# Patient Record
Sex: MALE | Race: OTHER | Employment: UNEMPLOYED | URBAN - METROPOLITAN AREA
[De-identification: names, ages, dates, MRNs, and addresses within clinical notes are randomized per-mention and may not be internally consistent; named-entity substitution may affect disease eponyms.]

---

## 2017-01-05 ENCOUNTER — HOSPITAL ENCOUNTER (EMERGENCY)
Age: 82
Discharge: HOME | End: 2017-01-05
Attending: EMERGENCY MEDICINE
Payer: MEDICARE

## 2017-01-05 VITALS
HEART RATE: 74 BPM | RESPIRATION RATE: 16 BRPM | TEMPERATURE: 97.7 F | DIASTOLIC BLOOD PRESSURE: 60 MMHG | SYSTOLIC BLOOD PRESSURE: 136 MMHG | OXYGEN SATURATION: 94 %

## 2017-01-05 DIAGNOSIS — S51.811A SKIN TEAR OF FOREARM WITHOUT COMPLICATION, RIGHT, INITIAL ENCOUNTER: Primary | ICD-10-CM

## 2017-01-05 PROCEDURE — 99283 EMERGENCY DEPT VISIT LOW MDM: CPT | Performed by: EMERGENCY MEDICINE

## 2017-01-05 PROCEDURE — 74011250636 HC RX REV CODE- 250/636: Performed by: EMERGENCY MEDICINE

## 2017-01-05 PROCEDURE — 90471 IMMUNIZATION ADMIN: CPT | Performed by: EMERGENCY MEDICINE

## 2017-01-05 PROCEDURE — 90715 TDAP VACCINE 7 YRS/> IM: CPT | Performed by: EMERGENCY MEDICINE

## 2017-01-05 RX ADMIN — TETANUS TOXOID, REDUCED DIPHTHERIA TOXOID AND ACELLULAR PERTUSSIS VACCINE, ADSORBED 0.5 ML: 5; 2.5; 8; 8; 2.5 SUSPENSION INTRAMUSCULAR at 09:04

## 2017-01-05 NOTE — ED NOTES
Wound to right forearm cleaned with Wound . Steri-strips placed and coban dressing applied per CHAIM Camarena RN. Pt tolerated well.
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
## 2017-01-05 NOTE — DISCHARGE INSTRUCTIONS
Skin Tears: Care Instructions  Your Care Instructions  As we get older, our skin gets drier and more fragile. Sometimes this can cause the outer layers of skin to split and tear open. Skin tears are treated in different ways. In some cases, doctors use pieces of tape called Steri-Strips to pull the skin together and help it heal. Other times, it's best to leave the tear open and cover it with a special wound-care bandage. Skin tears are usually not serious. They usually heal in a few weeks. But how long you take to heal depends on your body and the type of tear you have. Sometimes the torn piece of skin is used to protect the wound while it heals. But that piece of skin does not heal. It may fall off on its own. Or the doctor may remove it. As your tear heals, it's important to keep it clean to help prevent infection. The doctor has checked you carefully, but problems can develop later. If you notice any problems or new symptoms, get medical treatment right away. Follow-up care is a key part of your treatment and safety. Be sure to make and go to all appointments, and call your doctor if you are having problems. It's also a good idea to know your test results and keep a list of the medicines you take. How can you care for yourself at home? · If you have pain, ask your doctor if you can take an over-the-counter pain medicine, such as acetaminophen (Tylenol), ibuprofen (Advil, Motrin), or naproxen (Aleve). Be safe with medicines. Read and follow all instructions on the label. · If you have a bandage, follow your doctor's instructions for changing it. · If you have Steri-Strips, leave them on until they fall off. · Follow your doctor's instructions about bathing. · Gently wash the skin tear with plain water 2 times a day. Do not rub the area. · Let the area air dry. Or you can pat it carefully with a soft towel. When should you call for help?   Call your doctor now or seek immediate medical care if:  · You have signs of infection, such as:  ¨ Increased pain, swelling, warmth, or redness around the tear. ¨ Red streaks leading from the tear. ¨ Pus draining from the tear. ¨ A fever. · The tear starts to bleed a lot. Small amounts of blood are normal.  Watch closely for changes in your health, and be sure to contact your doctor if:  · You do not get better as expected. Where can you learn more? Go to http://sukumar-pramod.info/. Enter B116 in the search box to learn more about \"Skin Tears: Care Instructions. \"  Current as of: May 27, 2016  Content Version: 11.1  © 6004-7520 [employer]. Care instructions adapted under license by [employer] (which disclaims liability or warranty for this information). If you have questions about a medical condition or this instruction, always ask your healthcare professional. [address] any warranty or liability for your use of this information.
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
## 2017-01-05 NOTE — ED NOTES
Talked with  from facility and explained that pt was very cooperative here and was not suicidal so Dr. Kristin Shaver did not feel that a psych evaluation was necessary. Explained to her that pt can be committed from their facility if they feel it is necessary to do so.  is upset and feels that the pt should have been sent to another facility from the ER. Explained again that the MD did not feel the pt was a threat to himself or others.

## 2017-10-27 ENCOUNTER — HOSPITAL ENCOUNTER (EMERGENCY)
Age: 82
Discharge: HOME | End: 2017-10-27
Attending: EMERGENCY MEDICINE
Payer: MEDICARE

## 2017-10-27 ENCOUNTER — APPOINTMENT (OUTPATIENT)
Dept: CT IMAGING | Age: 82
End: 2017-10-27
Attending: EMERGENCY MEDICINE
Payer: MEDICARE

## 2017-10-27 ENCOUNTER — APPOINTMENT (OUTPATIENT)
Dept: GENERAL RADIOLOGY | Age: 82
End: 2017-10-27
Attending: EMERGENCY MEDICINE
Payer: MEDICARE

## 2017-10-27 ENCOUNTER — APPOINTMENT (OUTPATIENT)
Dept: ULTRASOUND IMAGING | Age: 82
End: 2017-10-27
Attending: EMERGENCY MEDICINE
Payer: MEDICARE

## 2017-10-27 VITALS
BODY MASS INDEX: 27.49 KG/M2 | WEIGHT: 165 LBS | OXYGEN SATURATION: 97 % | HEART RATE: 88 BPM | DIASTOLIC BLOOD PRESSURE: 80 MMHG | HEIGHT: 65 IN | TEMPERATURE: 98 F | RESPIRATION RATE: 16 BRPM | SYSTOLIC BLOOD PRESSURE: 190 MMHG

## 2017-10-27 DIAGNOSIS — S62.647A CLOSED NONDISPLACED FRACTURE OF PROXIMAL PHALANX OF LEFT LITTLE FINGER, INITIAL ENCOUNTER: Primary | ICD-10-CM

## 2017-10-27 LAB
ALBUMIN SERPL-MCNC: 3.2 G/DL (ref 3.2–4.6)
ALBUMIN/GLOB SERPL: 0.9 {RATIO} (ref 1.2–3.5)
ALP SERPL-CCNC: 69 U/L (ref 50–136)
ALT SERPL-CCNC: 19 U/L (ref 12–65)
ANION GAP SERPL CALC-SCNC: 5 MMOL/L (ref 7–16)
AST SERPL-CCNC: 18 U/L (ref 15–37)
ATRIAL RATE: 56 BPM
BASOPHILS # BLD: 0 K/UL (ref 0–0.2)
BASOPHILS NFR BLD: 0 % (ref 0–2)
BILIRUB SERPL-MCNC: 0.4 MG/DL (ref 0.2–1.1)
BNP SERPL-MCNC: 569 PG/ML
BUN SERPL-MCNC: 20 MG/DL (ref 8–23)
CALCIUM SERPL-MCNC: 8.7 MG/DL (ref 8.3–10.4)
CALCULATED P AXIS, ECG09: 78 DEGREES
CALCULATED R AXIS, ECG10: 69 DEGREES
CALCULATED T AXIS, ECG11: -117 DEGREES
CHLORIDE SERPL-SCNC: 105 MMOL/L (ref 98–107)
CO2 SERPL-SCNC: 33 MMOL/L (ref 21–32)
CREAT SERPL-MCNC: 1.59 MG/DL (ref 0.8–1.5)
DIAGNOSIS, 93000: NORMAL
DIFFERENTIAL METHOD BLD: ABNORMAL
EOSINOPHIL # BLD: 0.6 K/UL (ref 0–0.8)
EOSINOPHIL NFR BLD: 9 % (ref 0.5–7.8)
ERYTHROCYTE [DISTWIDTH] IN BLOOD BY AUTOMATED COUNT: 13.3 % (ref 11.9–14.6)
GLOBULIN SER CALC-MCNC: 3.6 G/DL (ref 2.3–3.5)
GLUCOSE SERPL-MCNC: 80 MG/DL (ref 65–100)
HCT VFR BLD AUTO: 39.8 % (ref 41.1–50.3)
HGB BLD-MCNC: 12.9 G/DL (ref 13.6–17.2)
IMM GRANULOCYTES # BLD: 0 K/UL (ref 0–0.5)
IMM GRANULOCYTES NFR BLD: 0 % (ref 0–5)
LYMPHOCYTES # BLD: 2 K/UL (ref 0.5–4.6)
LYMPHOCYTES NFR BLD: 32 % (ref 13–44)
MCH RBC QN AUTO: 32 PG (ref 26.1–32.9)
MCHC RBC AUTO-ENTMCNC: 32.4 G/DL (ref 31.4–35)
MCV RBC AUTO: 98.8 FL (ref 79.6–97.8)
MONOCYTES # BLD: 0.4 K/UL (ref 0.1–1.3)
MONOCYTES NFR BLD: 7 % (ref 4–12)
NEUTS SEG # BLD: 3.2 K/UL (ref 1.7–8.2)
NEUTS SEG NFR BLD: 52 % (ref 43–78)
P-R INTERVAL, ECG05: 216 MS
PLATELET # BLD AUTO: 155 K/UL (ref 150–450)
PMV BLD AUTO: 10.1 FL (ref 10.8–14.1)
POTASSIUM SERPL-SCNC: 4.1 MMOL/L (ref 3.5–5.1)
PROT SERPL-MCNC: 6.8 G/DL (ref 6.3–8.2)
Q-T INTERVAL, ECG07: 444 MS
QRS DURATION, ECG06: 74 MS
QTC CALCULATION (BEZET), ECG08: 428 MS
RBC # BLD AUTO: 4.03 M/UL (ref 4.23–5.67)
SODIUM SERPL-SCNC: 143 MMOL/L (ref 136–145)
TROPONIN I SERPL-MCNC: 0.02 NG/ML (ref 0.02–0.05)
VENTRICULAR RATE, ECG03: 56 BPM
WBC # BLD AUTO: 6.1 K/UL (ref 4.3–11.1)

## 2017-10-27 PROCEDURE — 99285 EMERGENCY DEPT VISIT HI MDM: CPT | Performed by: EMERGENCY MEDICINE

## 2017-10-27 PROCEDURE — 80053 COMPREHEN METABOLIC PANEL: CPT | Performed by: EMERGENCY MEDICINE

## 2017-10-27 PROCEDURE — 71020 XR CHEST PA LAT: CPT

## 2017-10-27 PROCEDURE — 74011000250 HC RX REV CODE- 250: Performed by: EMERGENCY MEDICINE

## 2017-10-27 PROCEDURE — 93005 ELECTROCARDIOGRAM TRACING: CPT | Performed by: EMERGENCY MEDICINE

## 2017-10-27 PROCEDURE — 93971 EXTREMITY STUDY: CPT

## 2017-10-27 PROCEDURE — 73130 X-RAY EXAM OF HAND: CPT

## 2017-10-27 PROCEDURE — 85025 COMPLETE CBC W/AUTO DIFF WBC: CPT | Performed by: EMERGENCY MEDICINE

## 2017-10-27 PROCEDURE — 84484 ASSAY OF TROPONIN QUANT: CPT | Performed by: EMERGENCY MEDICINE

## 2017-10-27 PROCEDURE — 70450 CT HEAD/BRAIN W/O DYE: CPT

## 2017-10-27 PROCEDURE — 83880 ASSAY OF NATRIURETIC PEPTIDE: CPT | Performed by: EMERGENCY MEDICINE

## 2017-10-27 RX ORDER — IPRATROPIUM BROMIDE AND ALBUTEROL SULFATE 2.5; .5 MG/3ML; MG/3ML
3 SOLUTION RESPIRATORY (INHALATION)
Status: COMPLETED | OUTPATIENT
Start: 2017-10-27 | End: 2017-10-27

## 2017-10-27 RX ADMIN — IPRATROPIUM BROMIDE AND ALBUTEROL SULFATE 3 ML: .5; 3 SOLUTION RESPIRATORY (INHALATION) at 14:53

## 2017-10-27 NOTE — ED NOTES
Called report back to [facility] to [place], Dr. Edwin Keating was made aware of pt's BP, MD was o.k.  With [place] keeping with pt's regular scheduled BP medications when he arrived back

## 2017-10-27 NOTE — PROGRESS NOTES
present for nurse and doctor's assessment.           Marcelino Luna CHI//Patient Relations  [place]  [address] / [place], [address] [facility]

## 2017-10-27 NOTE — DISCHARGE INSTRUCTIONS
As we discussed, he can try to keep the splint on his hand for the next 2 or 3 weeks. However, if it becomes a struggle due to his dementia and is not the kind of injury that will get significantly worse if it is not splinted and you do not need to fight with him to keep it on. Return with any fevers, vomiting, worsening symptoms, or additional concerns. Follow up with his primary care doctor to have his hand   rexrayed in approximately 2 weeks.
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
## 2018-02-13 ENCOUNTER — HOSPITAL ENCOUNTER (OUTPATIENT)
Dept: LAB | Age: 83
Discharge: HOME | End: 2018-02-13

## 2018-02-13 LAB
ALBUMIN SERPL-MCNC: 3.2 G/DL (ref 3.2–4.6)
ALBUMIN/GLOB SERPL: 1.1 {RATIO} (ref 1.2–3.5)
ALP SERPL-CCNC: 49 U/L (ref 50–136)
ALT SERPL-CCNC: 19 U/L (ref 12–65)
ANION GAP SERPL CALC-SCNC: 9 MMOL/L (ref 7–16)
AST SERPL-CCNC: 19 U/L (ref 15–37)
BILIRUB SERPL-MCNC: 0.5 MG/DL (ref 0.2–1.1)
BUN SERPL-MCNC: 19 MG/DL (ref 8–23)
CALCIUM SERPL-MCNC: 8.3 MG/DL (ref 8.3–10.4)
CHLORIDE SERPL-SCNC: 106 MMOL/L (ref 98–107)
CO2 SERPL-SCNC: 28 MMOL/L (ref 21–32)
CREAT SERPL-MCNC: 1.35 MG/DL (ref 0.8–1.5)
ERYTHROCYTE [DISTWIDTH] IN BLOOD BY AUTOMATED COUNT: 13.4 % (ref 11.9–14.6)
GLOBULIN SER CALC-MCNC: 2.9 G/DL (ref 2.3–3.5)
GLUCOSE SERPL-MCNC: 89 MG/DL (ref 65–100)
HCT VFR BLD AUTO: 37.8 % (ref 41.1–50.3)
HGB BLD-MCNC: 12.5 G/DL (ref 13.6–17.2)
MCH RBC QN AUTO: 32.5 PG (ref 26.1–32.9)
MCHC RBC AUTO-ENTMCNC: 33.1 G/DL (ref 31.4–35)
MCV RBC AUTO: 98.2 FL (ref 79.6–97.8)
PLATELET # BLD AUTO: 142 K/UL (ref 150–450)
PMV BLD AUTO: 10 FL (ref 10.8–14.1)
POTASSIUM SERPL-SCNC: 4 MMOL/L (ref 3.5–5.1)
PROT SERPL-MCNC: 6.1 G/DL (ref 6.3–8.2)
RBC # BLD AUTO: 3.85 M/UL (ref 4.23–5.67)
SODIUM SERPL-SCNC: 143 MMOL/L (ref 136–145)
WBC # BLD AUTO: 5.6 K/UL (ref 4.3–11.1)

## 2018-02-13 PROCEDURE — 85027 COMPLETE CBC AUTOMATED: CPT | Performed by: GENERAL PRACTICE

## 2018-02-13 PROCEDURE — 80053 COMPREHEN METABOLIC PANEL: CPT | Performed by: GENERAL PRACTICE

## 2018-09-17 ENCOUNTER — APPOINTMENT (OUTPATIENT)
Dept: CT IMAGING | Age: 83
DRG: 689 | End: 2018-09-17
Attending: EMERGENCY MEDICINE
Payer: MEDICARE

## 2018-09-17 ENCOUNTER — HOSPITAL ENCOUNTER (INPATIENT)
Age: 83
LOS: 1 days | Discharge: NURSING, REHAB OR LONG-TERM CARE | DRG: 689 | End: 2018-09-20
Attending: EMERGENCY MEDICINE | Admitting: INTERNAL MEDICINE
Payer: MEDICARE

## 2018-09-17 ENCOUNTER — APPOINTMENT (OUTPATIENT)
Dept: GENERAL RADIOLOGY | Age: 83
DRG: 689 | End: 2018-09-17
Attending: EMERGENCY MEDICINE
Payer: MEDICARE

## 2018-09-17 DIAGNOSIS — G93.40 ACUTE ENCEPHALOPATHY: Primary | ICD-10-CM

## 2018-09-17 DIAGNOSIS — N30.00 ACUTE CYSTITIS WITHOUT HEMATURIA: ICD-10-CM

## 2018-09-17 PROBLEM — G93.41 ACUTE METABOLIC ENCEPHALOPATHY: Status: ACTIVE | Noted: 2018-09-17

## 2018-09-17 PROBLEM — N39.0 UTI (URINARY TRACT INFECTION): Status: ACTIVE | Noted: 2018-09-17

## 2018-09-17 LAB
ALBUMIN SERPL-MCNC: 3.1 G/DL (ref 3.2–4.6)
ALBUMIN/GLOB SERPL: 0.9 {RATIO} (ref 1.2–3.5)
ALP SERPL-CCNC: 48 U/L (ref 50–136)
ALT SERPL-CCNC: 17 U/L (ref 12–65)
AMMONIA PLAS-SCNC: 28 UMOL/L (ref 11–32)
AMPHET UR QL SCN: NEGATIVE
ANION GAP SERPL CALC-SCNC: 9 MMOL/L (ref 7–16)
APPEARANCE UR: CLEAR
AST SERPL-CCNC: 18 U/L (ref 15–37)
ATRIAL RATE: 64 BPM
BACTERIA URNS QL MICRO: ABNORMAL /HPF
BARBITURATES UR QL SCN: NEGATIVE
BASOPHILS # BLD: 0 K/UL (ref 0–0.2)
BASOPHILS NFR BLD: 0 % (ref 0–2)
BENZODIAZ UR QL: NEGATIVE
BILIRUB SERPL-MCNC: 0.3 MG/DL (ref 0.2–1.1)
BILIRUB UR QL: NEGATIVE
BUN SERPL-MCNC: 20 MG/DL (ref 8–23)
CALCIUM SERPL-MCNC: 8.6 MG/DL (ref 8.3–10.4)
CALCULATED P AXIS, ECG09: 73 DEGREES
CALCULATED R AXIS, ECG10: 54 DEGREES
CALCULATED T AXIS, ECG11: -101 DEGREES
CANNABINOIDS UR QL SCN: NEGATIVE
CASTS URNS QL MICRO: ABNORMAL /LPF
CHLORIDE SERPL-SCNC: 108 MMOL/L (ref 98–107)
CK SERPL-CCNC: 284 U/L (ref 21–215)
CO2 SERPL-SCNC: 27 MMOL/L (ref 21–32)
COCAINE UR QL SCN: NEGATIVE
COLOR UR: YELLOW
CREAT SERPL-MCNC: 1.21 MG/DL (ref 0.8–1.5)
DIAGNOSIS, 93000: NORMAL
DIFFERENTIAL METHOD BLD: ABNORMAL
EOSINOPHIL # BLD: 0.3 K/UL (ref 0–0.8)
EOSINOPHIL NFR BLD: 5 % (ref 0.5–7.8)
EPI CELLS #/AREA URNS HPF: 0 /HPF
ERYTHROCYTE [DISTWIDTH] IN BLOOD BY AUTOMATED COUNT: 13.3 %
GLOBULIN SER CALC-MCNC: 3.4 G/DL (ref 2.3–3.5)
GLUCOSE SERPL-MCNC: 104 MG/DL (ref 65–100)
GLUCOSE UR STRIP.AUTO-MCNC: NEGATIVE MG/DL
HCT VFR BLD AUTO: 39.1 % (ref 41.1–50.3)
HGB BLD-MCNC: 13.1 G/DL (ref 13.6–17.2)
HGB UR QL STRIP: NEGATIVE
IMM GRANULOCYTES # BLD: 0 K/UL (ref 0–0.5)
IMM GRANULOCYTES NFR BLD AUTO: 0 % (ref 0–5)
KETONES UR QL STRIP.AUTO: NEGATIVE MG/DL
LACTATE BLD-SCNC: 1.5 MMOL/L (ref 0.5–1.9)
LEUKOCYTE ESTERASE UR QL STRIP.AUTO: ABNORMAL
LYMPHOCYTES # BLD: 1.9 K/UL (ref 0.5–4.6)
LYMPHOCYTES NFR BLD: 36 % (ref 13–44)
MAGNESIUM SERPL-MCNC: 2.1 MG/DL (ref 1.8–2.4)
MCH RBC QN AUTO: 33.9 PG (ref 26.1–32.9)
MCHC RBC AUTO-ENTMCNC: 33.5 G/DL (ref 31.4–35)
MCV RBC AUTO: 101 FL (ref 79.6–97.8)
METHADONE UR QL: NEGATIVE
MONOCYTES # BLD: 0.5 K/UL (ref 0.1–1.3)
MONOCYTES NFR BLD: 10 % (ref 4–12)
NEUTS SEG # BLD: 2.5 K/UL (ref 1.7–8.2)
NEUTS SEG NFR BLD: 48 % (ref 43–78)
NITRITE UR QL STRIP.AUTO: POSITIVE
NRBC # BLD: 0 K/UL (ref 0–0.2)
OPIATES UR QL: NEGATIVE
P-R INTERVAL, ECG05: 202 MS
PCP UR QL: NEGATIVE
PH UR STRIP: 7 [PH] (ref 5–9)
PLATELET # BLD AUTO: 108 K/UL (ref 150–450)
PMV BLD AUTO: 11.3 FL (ref 9.4–12.3)
POTASSIUM SERPL-SCNC: 3.6 MMOL/L (ref 3.5–5.1)
PROCALCITONIN SERPL-MCNC: <0.1 NG/ML
PROT SERPL-MCNC: 6.5 G/DL (ref 6.3–8.2)
PROT UR STRIP-MCNC: 100 MG/DL
Q-T INTERVAL, ECG07: 398 MS
QRS DURATION, ECG06: 68 MS
QTC CALCULATION (BEZET), ECG08: 410 MS
RBC # BLD AUTO: 3.87 M/UL (ref 4.23–5.6)
RBC #/AREA URNS HPF: ABNORMAL /HPF
SODIUM SERPL-SCNC: 144 MMOL/L (ref 136–145)
SP GR UR REFRACTOMETRY: 1.02 (ref 1–1.02)
TROPONIN I BLD-MCNC: 0.04 NG/ML (ref 0.02–0.05)
UROBILINOGEN UR QL STRIP.AUTO: 1 EU/DL (ref 0.2–1)
VENTRICULAR RATE, ECG03: 64 BPM
WBC # BLD AUTO: 5.2 K/UL (ref 4.3–11.1)
WBC URNS QL MICRO: ABNORMAL /HPF

## 2018-09-17 PROCEDURE — 83735 ASSAY OF MAGNESIUM: CPT

## 2018-09-17 PROCEDURE — 82140 ASSAY OF AMMONIA: CPT

## 2018-09-17 PROCEDURE — 71045 X-RAY EXAM CHEST 1 VIEW: CPT

## 2018-09-17 PROCEDURE — 99218 HC RM OBSERVATION: CPT

## 2018-09-17 PROCEDURE — 93005 ELECTROCARDIOGRAM TRACING: CPT | Performed by: EMERGENCY MEDICINE

## 2018-09-17 PROCEDURE — 96365 THER/PROPH/DIAG IV INF INIT: CPT | Performed by: EMERGENCY MEDICINE

## 2018-09-17 PROCEDURE — 77030005518 HC CATH URETH FOL 2W BARD -B: Performed by: EMERGENCY MEDICINE

## 2018-09-17 PROCEDURE — 83605 ASSAY OF LACTIC ACID: CPT

## 2018-09-17 PROCEDURE — 74011000258 HC RX REV CODE- 258: Performed by: EMERGENCY MEDICINE

## 2018-09-17 PROCEDURE — 80053 COMPREHEN METABOLIC PANEL: CPT

## 2018-09-17 PROCEDURE — 80307 DRUG TEST PRSMV CHEM ANLYZR: CPT

## 2018-09-17 PROCEDURE — 84145 PROCALCITONIN (PCT): CPT

## 2018-09-17 PROCEDURE — 70450 CT HEAD/BRAIN W/O DYE: CPT

## 2018-09-17 PROCEDURE — 74011250636 HC RX REV CODE- 250/636: Performed by: INTERNAL MEDICINE

## 2018-09-17 PROCEDURE — 74011250636 HC RX REV CODE- 250/636: Performed by: EMERGENCY MEDICINE

## 2018-09-17 PROCEDURE — 51702 INSERT TEMP BLADDER CATH: CPT | Performed by: EMERGENCY MEDICINE

## 2018-09-17 PROCEDURE — 81001 URINALYSIS AUTO W/SCOPE: CPT

## 2018-09-17 PROCEDURE — 99285 EMERGENCY DEPT VISIT HI MDM: CPT | Performed by: EMERGENCY MEDICINE

## 2018-09-17 PROCEDURE — 87040 BLOOD CULTURE FOR BACTERIA: CPT

## 2018-09-17 PROCEDURE — 87186 SC STD MICRODIL/AGAR DIL: CPT

## 2018-09-17 PROCEDURE — 77030020263 HC SOL INJ SOD CL0.9% LFCR 1000ML

## 2018-09-17 PROCEDURE — 87086 URINE CULTURE/COLONY COUNT: CPT

## 2018-09-17 PROCEDURE — 85025 COMPLETE CBC W/AUTO DIFF WBC: CPT

## 2018-09-17 PROCEDURE — 87088 URINE BACTERIA CULTURE: CPT

## 2018-09-17 PROCEDURE — 82550 ASSAY OF CK (CPK): CPT

## 2018-09-17 PROCEDURE — 84484 ASSAY OF TROPONIN QUANT: CPT

## 2018-09-17 RX ORDER — TAMSULOSIN HYDROCHLORIDE 0.4 MG/1
0.4 CAPSULE ORAL DAILY
Status: DISCONTINUED | OUTPATIENT
Start: 2018-09-18 | End: 2018-09-20 | Stop reason: HOSPADM

## 2018-09-17 RX ORDER — NITROGLYCERIN 0.4 MG/1
0.4 TABLET SUBLINGUAL
Status: DISCONTINUED | OUTPATIENT
Start: 2018-09-17 | End: 2018-09-20 | Stop reason: HOSPADM

## 2018-09-17 RX ORDER — MEMANTINE HYDROCHLORIDE 28 MG/1
28 CAPSULE, EXTENDED RELEASE ORAL DAILY
Status: CANCELLED | OUTPATIENT
Start: 2018-09-18

## 2018-09-17 RX ORDER — MEMANTINE HYDROCHLORIDE 5 MG/1
5 TABLET ORAL 2 TIMES DAILY
Status: DISCONTINUED | OUTPATIENT
Start: 2018-09-18 | End: 2018-09-20 | Stop reason: HOSPADM

## 2018-09-17 RX ORDER — SERTRALINE HYDROCHLORIDE 25 MG/1
25 TABLET, FILM COATED ORAL EVERY EVENING
Status: CANCELLED | OUTPATIENT
Start: 2018-09-18

## 2018-09-17 RX ORDER — ONDANSETRON 2 MG/ML
4 INJECTION INTRAMUSCULAR; INTRAVENOUS
Status: DISCONTINUED | OUTPATIENT
Start: 2018-09-17 | End: 2018-09-20 | Stop reason: HOSPADM

## 2018-09-17 RX ORDER — HEPARIN SODIUM 5000 [USP'U]/ML
5000 INJECTION, SOLUTION INTRAVENOUS; SUBCUTANEOUS EVERY 12 HOURS
Status: DISCONTINUED | OUTPATIENT
Start: 2018-09-17 | End: 2018-09-20 | Stop reason: HOSPADM

## 2018-09-17 RX ORDER — RIVASTIGMINE 13.3 MG/24H
1 PATCH, EXTENDED RELEASE TRANSDERMAL DAILY
Status: DISCONTINUED | OUTPATIENT
Start: 2018-09-18 | End: 2018-09-20 | Stop reason: HOSPADM

## 2018-09-17 RX ORDER — FUROSEMIDE 20 MG/1
20 TABLET ORAL DAILY
Status: DISCONTINUED | OUTPATIENT
Start: 2018-09-18 | End: 2018-09-20 | Stop reason: HOSPADM

## 2018-09-17 RX ORDER — LORAZEPAM 2 MG/ML
INJECTION INTRAMUSCULAR
Status: DISCONTINUED
Start: 2018-09-17 | End: 2018-09-18

## 2018-09-17 RX ORDER — LORAZEPAM 2 MG/ML
INJECTION INTRAMUSCULAR
Status: DISCONTINUED
Start: 2018-09-17 | End: 2018-09-18 | Stop reason: SDUPTHER

## 2018-09-17 RX ORDER — ASPIRIN 81 MG/1
81 TABLET ORAL DAILY
Status: DISCONTINUED | OUTPATIENT
Start: 2018-09-18 | End: 2018-09-20 | Stop reason: HOSPADM

## 2018-09-17 RX ORDER — NITROGLYCERIN 0.4 MG/1
0.4 TABLET SUBLINGUAL AS NEEDED
Status: CANCELLED | OUTPATIENT
Start: 2018-09-17

## 2018-09-17 RX ORDER — ESCITALOPRAM OXALATE 10 MG/1
10 TABLET ORAL DAILY
Status: DISCONTINUED | OUTPATIENT
Start: 2018-09-18 | End: 2018-09-20 | Stop reason: HOSPADM

## 2018-09-17 RX ORDER — LEVOTHYROXINE SODIUM 75 UG/1
75 TABLET ORAL
Status: DISCONTINUED | OUTPATIENT
Start: 2018-09-18 | End: 2018-09-20 | Stop reason: HOSPADM

## 2018-09-17 RX ORDER — ALBUTEROL SULFATE 0.83 MG/ML
2.5 SOLUTION RESPIRATORY (INHALATION)
Status: DISCONTINUED | OUTPATIENT
Start: 2018-09-17 | End: 2018-09-20 | Stop reason: HOSPADM

## 2018-09-17 RX ORDER — VALPROIC ACID 250 MG/5ML
250 SOLUTION ORAL 2 TIMES DAILY
Status: DISCONTINUED | OUTPATIENT
Start: 2018-09-18 | End: 2018-09-20 | Stop reason: HOSPADM

## 2018-09-17 RX ORDER — LOSARTAN POTASSIUM 50 MG/1
100 TABLET ORAL DAILY
Status: DISCONTINUED | OUTPATIENT
Start: 2018-09-18 | End: 2018-09-20 | Stop reason: HOSPADM

## 2018-09-17 RX ORDER — ACETAMINOPHEN 325 MG/1
650 TABLET ORAL
Status: DISCONTINUED | OUTPATIENT
Start: 2018-09-17 | End: 2018-09-20 | Stop reason: HOSPADM

## 2018-09-17 RX ORDER — DOCUSATE SODIUM 100 MG/1
100 CAPSULE, LIQUID FILLED ORAL 2 TIMES DAILY
Status: DISCONTINUED | OUTPATIENT
Start: 2018-09-18 | End: 2018-09-20 | Stop reason: HOSPADM

## 2018-09-17 RX ORDER — DONEPEZIL HYDROCHLORIDE 5 MG/1
10 TABLET, FILM COATED ORAL
Status: CANCELLED | OUTPATIENT
Start: 2018-09-17

## 2018-09-17 RX ORDER — QUETIAPINE FUMARATE 25 MG/1
25 TABLET, FILM COATED ORAL
Status: CANCELLED | OUTPATIENT
Start: 2018-09-17

## 2018-09-17 RX ORDER — SODIUM CHLORIDE 9 MG/ML
75 INJECTION, SOLUTION INTRAVENOUS CONTINUOUS
Status: DISCONTINUED | OUTPATIENT
Start: 2018-09-17 | End: 2018-09-18

## 2018-09-17 RX ORDER — ALPRAZOLAM 0.5 MG/1
0.25 TABLET ORAL
Status: CANCELLED | OUTPATIENT
Start: 2018-09-17

## 2018-09-17 RX ORDER — PRAVASTATIN SODIUM 20 MG/1
20 TABLET ORAL
Status: DISCONTINUED | OUTPATIENT
Start: 2018-09-17 | End: 2018-09-20 | Stop reason: HOSPADM

## 2018-09-17 RX ORDER — AMLODIPINE BESYLATE 5 MG/1
5 TABLET ORAL DAILY
Status: DISCONTINUED | OUTPATIENT
Start: 2018-09-18 | End: 2018-09-20 | Stop reason: HOSPADM

## 2018-09-17 RX ORDER — METOPROLOL SUCCINATE 25 MG/1
12.5 TABLET, EXTENDED RELEASE ORAL DAILY
Status: DISCONTINUED | OUTPATIENT
Start: 2018-09-18 | End: 2018-09-20 | Stop reason: HOSPADM

## 2018-09-17 RX ADMIN — CEFTRIAXONE SODIUM 1 G: 1 INJECTION, POWDER, FOR SOLUTION INTRAMUSCULAR; INTRAVENOUS at 19:51

## 2018-09-17 RX ADMIN — SODIUM CHLORIDE 75 ML/HR: 900 INJECTION, SOLUTION INTRAVENOUS at 23:22

## 2018-09-17 RX ADMIN — HEPARIN SODIUM 5000 UNITS: 5000 INJECTION INTRAVENOUS; SUBCUTANEOUS at 23:27

## 2018-09-17 NOTE — ED TRIAGE NOTES
Patient arrives via [facility] from [facility]. Daughter reports decreased responsiveness for the past several days. Patient with minimal eating, drinking, urine output. Responsive to pain. 20G L forearm. bgl 119.

## 2018-09-17 NOTE — ED PROVIDER NOTES
HPI Comments: Presents with decrease in intake and  Not opening his eyes not eating or drinking and not speaking for 5 days. He lives in nursing home. Family member reports no  Nausea vomiting or diarrhea that she knows of. He has complained of his shoulders hurting but she states he has dementia and gets confused sometimes. Family remember reports she knows something is wrong because patient usually goes down to dance eat and interact with people/women at the nursing home. Patient is a 80 y.o. male presenting with altered mental status. The history is provided by a relative (the dtr). The history is limited by the condition of the patient. Altered mental status This is a new problem. The current episode started more than 2 days ago. The problem has been gradually worsening. Associated symptoms include confusion, somnolence and weakness. Mental status baseline is moderate dementia. Risk factors include dementia. Past Medical History:  
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  
Procedure Laterality Date  HX CHOLECYSTECTOMY  HX HEMORRHOIDECTOMY  HX PROSTATECTOMY History reviewed. No pertinent family history. Social History Social History  Marital status:    Spouse name: N/A  
  Number of children: N/A  
 Years of education: N/A Occupational History  Not on file. Social History Main Topics  Smoking status: Unknown If Ever Smoked  Smokeless tobacco: Not on file Comment: FORMER SMOKER 8 CIGS/DAY QUIT 25 YRS AGO  Alcohol use No  
   Comment: WHISKEY, OCC  Drug use: Not on file  Sexual activity: Not on file Other Topics Concern  Not on file Social History Narrative ALLERGIES: Review of patient's allergies indicates no known allergies. Review of Systems Unable to perform ROS: Dementia Neurological: Positive for weakness. Psychiatric/Behavioral: Positive for confusion. Vitals:  
 09/17/18 1749 09/17/18 1759 09/17/18 1829 09/17/18 1900 BP: 119/59 124/57 138/63 171/72 Pulse: 71 71 66 71 Resp: 18  23 SpO2: 90% 96% 96% 96% Weight: 77.1 kg (170 lb) Height: 5' 5\" (1.651 m) Physical Exam  
Constitutional: He appears well-developed and well-nourished. He appears lethargic. No distress. HENT:  
Head: Normocephalic and atraumatic. Neck: Normal range of motion. Neck supple. Cardiovascular: Normal rate and regular rhythm. Pulmonary/Chest: Effort normal. No respiratory distress. He has no wheezes. He has no rales. Abdominal: Soft. He exhibits no distension. There is no tenderness. There is no rebound and no guarding. Musculoskeletal: Normal range of motion. He exhibits no edema. Neurological: He appears lethargic. He is disoriented. Will ans some questions Skin: Skin is warm and dry. He is not diaphoretic. Psychiatric: He has a normal mood and affect. His behavior is normal.  
Nursing note and vitals reviewed. MDM Number of Diagnoses or Management Options Acute cystitis without hematuria:  
Acute encephalopathy:  
Diagnosis management comments: Patient with dementia and UTI with no by mouth intake and  Worsening mental status.   We will admit to the hospitalist. 
 
 No change while here but protecting airway. Pt is full code. Amount and/or Complexity of Data Reviewed Clinical lab tests: reviewed and ordered Review and summarize past medical records: yes Discuss the patient with other providers: yes Independent visualization of images, tracings, or specimens: yes (Nsr, t wave inversion no change from previous) Risk of Complications, Morbidity, and/or Mortality Presenting problems: high Diagnostic procedures: moderate Management options: high Patient Progress Patient progress: improved ED Course Procedures

## 2018-09-18 LAB
GLUCOSE BLD STRIP.AUTO-MCNC: 101 MG/DL (ref 65–100)
GLUCOSE BLD STRIP.AUTO-MCNC: 82 MG/DL (ref 65–100)
GLUCOSE BLD STRIP.AUTO-MCNC: 89 MG/DL (ref 65–100)

## 2018-09-18 PROCEDURE — G8978 MOBILITY CURRENT STATUS: HCPCS

## 2018-09-18 PROCEDURE — 82962 GLUCOSE BLOOD TEST: CPT

## 2018-09-18 PROCEDURE — 74011250636 HC RX REV CODE- 250/636: Performed by: INTERNAL MEDICINE

## 2018-09-18 PROCEDURE — 99218 HC RM OBSERVATION: CPT

## 2018-09-18 PROCEDURE — 74011000258 HC RX REV CODE- 258: Performed by: INTERNAL MEDICINE

## 2018-09-18 PROCEDURE — 77030020263 HC SOL INJ SOD CL0.9% LFCR 1000ML

## 2018-09-18 PROCEDURE — 74011250637 HC RX REV CODE- 250/637: Performed by: INTERNAL MEDICINE

## 2018-09-18 PROCEDURE — G8979 MOBILITY GOAL STATUS: HCPCS

## 2018-09-18 PROCEDURE — 77010033678 HC OXYGEN DAILY

## 2018-09-18 PROCEDURE — 97162 PT EVAL MOD COMPLEX 30 MIN: CPT

## 2018-09-18 PROCEDURE — 94760 N-INVAS EAR/PLS OXIMETRY 1: CPT

## 2018-09-18 RX ORDER — HYDRALAZINE HYDROCHLORIDE 20 MG/ML
10 INJECTION INTRAMUSCULAR; INTRAVENOUS
Status: DISCONTINUED | OUTPATIENT
Start: 2018-09-18 | End: 2018-09-20 | Stop reason: HOSPADM

## 2018-09-18 RX ORDER — LORAZEPAM 2 MG/ML
0.5 INJECTION INTRAMUSCULAR ONCE
Status: COMPLETED | OUTPATIENT
Start: 2018-09-18 | End: 2018-09-18

## 2018-09-18 RX ADMIN — LOSARTAN POTASSIUM 100 MG: 50 TABLET ORAL at 09:26

## 2018-09-18 RX ADMIN — TAMSULOSIN HYDROCHLORIDE 0.4 MG: 0.4 CAPSULE ORAL at 09:28

## 2018-09-18 RX ADMIN — ASPIRIN 81 MG: 81 TABLET, COATED ORAL at 09:27

## 2018-09-18 RX ADMIN — HEPARIN SODIUM 5000 UNITS: 5000 INJECTION INTRAVENOUS; SUBCUTANEOUS at 11:00

## 2018-09-18 RX ADMIN — HEPARIN SODIUM 5000 UNITS: 5000 INJECTION INTRAVENOUS; SUBCUTANEOUS at 22:02

## 2018-09-18 RX ADMIN — ESCITALOPRAM OXALATE 10 MG: 10 TABLET ORAL at 09:27

## 2018-09-18 RX ADMIN — LORAZEPAM 0.5 MG: 2 INJECTION INTRAMUSCULAR; INTRAVENOUS at 18:44

## 2018-09-18 RX ADMIN — MEMANTINE HYDROCHLORIDE 5 MG: 5 TABLET ORAL at 09:26

## 2018-09-18 RX ADMIN — METOPROLOL SUCCINATE 12.5 MG: 25 TABLET, EXTENDED RELEASE ORAL at 09:27

## 2018-09-18 RX ADMIN — SODIUM CHLORIDE 75 ML/HR: 900 INJECTION, SOLUTION INTRAVENOUS at 12:51

## 2018-09-18 RX ADMIN — AMLODIPINE BESYLATE 5 MG: 5 TABLET ORAL at 09:26

## 2018-09-18 RX ADMIN — DOCUSATE SODIUM 100 MG: 100 CAPSULE, LIQUID FILLED ORAL at 09:27

## 2018-09-18 RX ADMIN — CEFTRIAXONE SODIUM 1 G: 1 INJECTION, POWDER, FOR SOLUTION INTRAMUSCULAR; INTRAVENOUS at 21:17

## 2018-09-18 RX ADMIN — VALPROIC ACID 250 MG: 250 SOLUTION ORAL at 09:26

## 2018-09-18 RX ADMIN — FUROSEMIDE 20 MG: 20 TABLET ORAL at 09:27

## 2018-09-18 NOTE — PROGRESS NOTES
Patient was admitted to hospital from [facility]. Patient can return to facility once medically stable. CM will continue to follow for discharge needs. Care Management Interventions PCP Verified by CM: Yes Mode of Transport at Discharge: BLS Transition of Care Consult (CM Consult): Discharge Planning, SNF Discharge Durable Medical Equipment: No 
Physical Therapy Consult: Yes Occupational Therapy Consult: Yes Current Support Network: Own Home Confirm Follow Up Transport: Other (see comment) Plan discussed with Pt/Family/Caregiver: Yes Freedom of Choice Offered: Yes Discharge Location Discharge Placement: Skilled nursing facility

## 2018-09-18 NOTE — PROGRESS NOTES
During removal of rousseau catheter pt began yelling in [place], holding onto catheter tubing after removal. CNA in to assist with removing tubing from pt's hands. After removing tubing from pt's hands, pt continued to yell out.

## 2018-09-18 NOTE — PROGRESS NOTES
Care manager attempted to deliver Medicare Outpatient Observation Notice to the patient but patient was unavailable for signature. Unisigned form left at patient's bedside. Unsigned copy placed in patient's chart. Care managers notified.

## 2018-09-18 NOTE — H&P
History and Physical 
 
Patient: Jermain Urbina MRN: [id number]  SSN: [id number] YOB: 1931  Age: 80 y.o. Sex: male Subjective:  
Cc :\" he was confused\" I AM A NATIVE [place] SPEAKER. I SPOKE WITH PATIENT'S DAUGHTER AND SHE PROVIDED ALL THE INFORMATION. Jermain Urbina is a 80 y.o. male who has a PMH of prostate cancer s/p prostatectomy on lupron injections, HTN, Alzheimer's dementia, chronic systolic HFpEF, HTN, P afib not on [address], CKD stage III, CAD, hypothyroidism, depression, BPH who was referred from [address] [place] [address] rehab after he was noted confused more than his baseline, decreased appetite, with chills for the pas 5 days. His daughter was present in the room and she provided all the information. She has noted dark colored stools as well, but patient is on chronic iron supplements, his HH remains stable. Denies fever, falls, vomiting, abdominal pain, nor diarrhea. Upon arrival to ER VS were stable. He was noted confused. Labs: normal troponin, lactic acid, no leukocytosis, chemistry unremarkable, ammonia 28, normal PCT. UA WBC . CRX and brain ct unrevealing. He received iv rocephin and hospitalist was contacted for admission due to acute metabolic encephalopathy secondary to UTI. PMH: as above Social hx: prior heavy smoker Family hx: unknown. Discussed with patient's daughter Past Medical History:  
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  
Procedure Laterality Date  HX CHOLECYSTECTOMY  HX HEMORRHOIDECTOMY  HX PROSTATECTOMY History reviewed. No pertinent family history. Social History Substance Use Topics  Smoking status: Unknown If Ever Smoked  Smokeless tobacco: Not on file Comment: FORMER SMOKER 8 CIGS/DAY QUIT 25 YRS AGO  Alcohol use No  
   Comment: [place]ISKEY, OCC Prior to Admission medications Medication Sig Start Date End Date Taking? Authorizing Provider  
cyanocobalamin 1,000 mcg tablet Take 1,000 mcg by mouth daily. Historical Provider  
donepezil (ARICEPT) 10 mg tablet Take 10 mg by mouth nightly. Historical Provider  
folic acid-vit F6-HJS L36 (FOLTX) 2.5-25-2 mg tablet Take 1 Tab by mouth daily. Historical Provider  
nitroglycerin (NITROSTAT) 0.4 mg SL tablet by SubLINGual route every five (5) minutes as needed for Chest Pain. Historical Provider  
nitroglycerin (NITROSTAT) 0.4 mg SL tablet by SubLINGual route every five (5) minutes as needed for Chest Pain. Historical Provider QUEtiapine (SEROQUEL) 25 mg tablet Take  by mouth. Historical Provider  
sertraline (ZOLOFT) 50 mg tablet Take  by mouth daily. Historical Provider  
traMADol (ULTRAM) 50 mg tablet Take 50 mg by mouth every six (6) hours as needed for Pain. Historical Provider  
albuterol (VENTOLIN HFA) 90 mcg/actuation inhaler Take  by inhalation. Historical Provider  
metoprolol succinate (TOPROL-XL) 25 mg XL tablet Take 1 Tab by mouth daily. 11/13/16   Basim Snow MD  
levothyroxine (SYNTHROID) 88 mcg tablet Take 1 Tab by mouth Daily (before breakfast). 11/13/16   Basim Snow MD  
ALPRAZolam Shilpa Snow) 0.25 mg tablet Take 1 Tab by mouth two (2) times daily as needed for Anxiety.  Max Daily Amount: 0.5 mg. 11/13/16   Basim Snow MD  
 ergocalciferol (DRISDOL) 50,000 unit capsule Take 50,000 Units by mouth every Monday. Billy Campos MD  
tamsulosin (FLOMAX) 0.4 mg capsule Take 0.4 mg by mouth daily. Billy Campos MD  
alendronate (FOSAMAX) 70 mg tablet Take 70 mg by mouth every Sunday. Billy Campos MD  
furosemide (LASIX) 40 mg tablet Take 40 mg by mouth daily. Billy Campos MD  
memantine ER (NAMENDA XR) 28 mg capsule Take 28 mg by mouth daily. Billy Campos MD  
amLODIPine (NORVASC) 5 mg tablet Take 5 mg by mouth daily. Billy Campos MD  
omeprazole (PRILOSEC) 20 mg capsule Take 20 mg by mouth daily. Billy Campos MD  
aspirin delayed-release 81 mg tablet Take 81 mg by mouth daily. Billy Campos MD  
losartan (COZAAR) 100 mg tablet Take 100 mg by mouth daily. Billy Campos MD  
oxybutynin chloride XL (DITROPAN XL) 5 mg CR tablet Take 5 mg by mouth nightly. Billy Campos MD  
pravastatin (PRAVACHOL) 20 mg tablet Take 20 mg by mouth nightly. Billy Campos MD  
calcium-cholecalciferol, d3, 600 mg calcium- 200 unit cap Take 1 Tab by mouth two (2) times a day. Billy Campos MD  
docusate sodium (COLACE) 100 mg capsule Take 100 mg by mouth two (2) times a day. Billy Campos MD  
DULoxetine (CYMBALTA) 60 mg capsule Take 60 mg by mouth two (2) times a day. Billy Campos MD  
ferrous sulfate (IRON) 325 mg (65 mg iron) tablet Take  by mouth two (2) times a day. Billy Campos MD  
  
 
No Known Allergies Review of Systems: 
unable since he was confused Objective:  
 
Vitals:  
 09/17/18 1959 09/17/18 2029 09/17/18 2100 09/17/18 2121 BP: 131/60 186/75 (!) 163/94 162/83 Pulse: 60 64 65 61 Resp: 16 19 23 17 Temp:      
SpO2: 94% 94% 96% 95% Weight:      
Height:      
  
 
Physical Exam: 
GENERAL: alert, no distress, appears stated age, he was able to answer some of my questions EYE: negative LYMPHATIC: Cervical, supraclavicular, and axillary nodes normal.  
THROAT & NECK: normal and no erythema or exudates noted. LUNG: clear to auscultation bilaterally HEART: regular rate and rhythm, S1, S2 normal, no murmur, click, rub or gallop ABDOMEN: soft, non-tender. Bowel sounds normal. No masses,  no organomegaly EXTREMITIES:  extremities normal, atraumatic, no cyanosis or edema SKIN: Normal. 
NEUROLOGIC: unable to complete cranial nerves due to confusion. He moved all his 4 limbs Assessment:  
 
Hospital Problems  Date Reviewed: 9/17/2018 Codes Class Noted POA  
 UTI (urinary tract infection) ICD-10-CM: N39.0 ICD-9-CM: 599.0  9/17/2018 Unknown * (Principal)Acute metabolic encephalopathy JXA-35-GI: G93.41 
ICD-9-CM: 348.31  9/17/2018 Unknown Alzheimer disease ICD-10-CM: G30.9, F02.80 ICD-9-CM: 331.0  Unknown Yes  
   
 CHF (congestive heart failure) (HCC) ICD-10-CM: I50.9 ICD-9-CM: 428.0  Unknown Yes Hypothyroidism ICD-10-CM: E03.9 ICD-9-CM: 244.9  Unknown Yes Dyslipidemia ICD-10-CM: E78.5 ICD-9-CM: 272.4  Unknown Yes Carotid artery stenosis ICD-10-CM: I65.29 ICD-9-CM: 433.10  Unknown Yes Prostate cancer [facility]) ICD-10-CM: Y51 ICD-9-CM: 185  Unknown Yes Diabetes ([facility] ICD-10-CM: E11.9 ICD-9-CM: 250.00  Unknown Yes Arteriosclerotic heart disease ICD-10-CM: I25.10 ICD-9-CM: 414.00  Unknown Yes Atrial fibrillation with RVR ([facility] ICD-10-CM: I48.91 
ICD-9-CM: 427.31  11/12/2016 Yes Dementia ICD-10-CM: F03.90 ICD-9-CM: 294.20  11/12/2016 Yes Plan: 1. Acute metabolic encephalopathy secondary to UTI. His confusion is worse than his normal baseline 2. History of Alzheimers dementia 3. Chronic systolic HFpEF: no signs of volume overload 4. HTN 
5.P afib, not on [address] 6. History of prostate ca, s/p surgery on lupron Plan: 
Admit as observation Cardiac diet Normal saline low rate for hydration Continue rocephin awaiting urine culture results Resume home meds Remove rousseau cath in 24 hrs F/U blood and urine cultures taken in the ED Daily labs PT/OT 
DVT ppx: heparin and GCS Code status: DNR/DNI. CONFIRMED WITH PATIENT'S DAUGHTER PRESENT IN THE ED. SHE IS HCPOA: Ms Can Vargas Estimated LOS < 2MN Risk: moderate Estimated DC planning: back to [facility]re[facility] once medically ready Goals of care discussed with patients Daughter Signed By: Mariza Calloway MD   
 September 17, 2018

## 2018-09-18 NOTE — PROGRESS NOTES
Problem: Mobility Impaired (Adult and Pediatric) Goal: *Acute Goals and Plan of Care (Insert Text) LTG: 
(1.)Mr. Gabriel Bazzi will move from supine to sit and sit to supine , scoot up and down and roll side to side with MINIMAL ASSIST within 7 treatment day(s). (2.)Mr. Gabriel Bazzi will transfer from bed to chair and chair to bed with MINIMAL ASSIST using the least restrictive device within 7 treatment day(s). (3.)Mr. Gabriel Bazzi will ambulate with MODERATE ASSIST for 10+ feet with the least restrictive device within 7 treatment day(s). (4.)Mr. Gabriel Bazzi will perform LE Exercises per HEP with min cueing for 10+ minutes to improve strength and mobility within 7 days. ________________________________________________________________________________________________ PHYSICAL THERAPY: Initial Assessment, AM 9/18/2018 OBSERVATION: Hospital Day: 2 [place] / Product Type: Medicare /  
  
NAME/AGE/GENDER: Riky Kaur is a 80 y.o. male PRIMARY DIAGNOSIS: UTI (urinary tract infection) Acute metabolic encephalopathy Acute metabolic encephalopathy Acute metabolic encephalopathy ICD-10: Treatment Diagnosis:  
 · Generalized Muscle Weakness (M62.81) · Difficulty in walking, Not elsewhere classified (R26.2) · Other abnormalities of gait and mobility (R26.89) Precaution/Allergies: 
Review of patient's allergies indicates no known allergies. ASSESSMENT:  
 
Mr. Gabriel Bazzi is an 80year old male admitted from long term care facility with acute encephalopathy, UTI. He is apparently a long term resident in nursing facility with a history of dementia. Per chart review is appear he is typically alert and interactive with other residents. He presents sitting up in bed this morning with eyes open however appears slightly drowsy and difficult to communicate with due to language barrier and mental status.  Despite visual and tactile cues for mobility, pt requires max assist of 2 for bed mobility, sit-stand transfer, and bed to chair transfer using walker. Needs manual assist to advance feet towards chair and mas assist of 2 to positioned comfortably in chair. Riki Henson is functioning below baseline with strength, mobility, and transfers and will benefit from continued therapy during acute care stay to address deficits/maximize independence with mobility. Per chart review, pt has bed hold and can return to facility when medically stable. This section established at most recent assessment PROBLEM LIST (Impairments causing functional limitations): 1. Decreased Strength 2. Decreased ADL/Functional Activities 3. Decreased Transfer Abilities 4. Decreased Ambulation Ability/Technique 5. Decreased Balance 6. Increased Pain 7. Decreased Activity Tolerance 8. Decreased Cognition INTERVENTIONS PLANNED: (Benefits and precautions of physical therapy have been discussed with the patient.) 1. Balance Exercise 2. Bed Mobility 3. Gait Training 4. Home Exercise Program (HEP) 5. Therapeutic Activites 6. Therapeutic Exercise/Strengthening 7. Transfer Training TREATMENT PLAN: Frequency/Duration: 3 times a week for duration of hospital stay Rehabilitation Potential For Stated Goals: Good RECOMMENDED REHABILITATION/EQUIPMENT: (at time of discharge pending progress): Due to the probability of continued deficits (see above) this patient may benefit from continued skilled physical therapy after discharge pending progress and baseline functional status. Equipment: ? TBD pending progress HISTORY:  
History of Present Injury/Illness (Reason for Referral): 
Per H&P, \"Danny Gallego is a 80 y.o. male who has a PMH of prostate cancer s/p prostatectomy on lupron injections, HTN, Alzheimer's dementia, chronic systolic HFpEF, HTN, P afib not on [address], CKD stage III, CAD, hypothyroidism, depression, BPH who was referred from [facility] rehab after he was noted confused more than his baseline, decreased appetite, with chills for the pas 5 days. His daughter was present in the room and she provided all the information. She has noted dark colored stools as well, but patient is on chronic iron supplements, his HH remains stable. Denies fever, falls, vomiting, abdominal pain, nor diarrhea. Upon arrival to ER VS were stable. He was noted confused. Labs: normal troponin, lactic acid, no leukocytosis, chemistry unremarkable, ammonia 28, normal PCT. UA WBC . CRX and brain ct unrevealing. He received iv rocephin and hospitalist was contacted for admission due to acute metabolic encephalopathy secondary to UTI\" Past Medical History/Comorbidities: Mr. 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 Mr. Can Presumpraful  has a past surgical history that includes hx hemorrhoidectomy; hx prostatectomy; and hx cholecystectomy. Social History/Living Environment:  
Home Environment: Skilled nursing facility One/Two Story Residence: One story Living Alone: No 
Support Systems: Family member(s), Skilled nursing facility Patient Expects to be Discharged to[de-identified] Skilled nursing facility Prior Level of Function/Work/Activity: 
Pt unable to provide information due to altered mental status, drowsiness, language barrier. Per chart review he is a resident in long term care facility. Unsure how his mobility is at baseline. Number of Personal Factors/Comorbidities that affect the Plan of Care: 1-2: MODERATE COMPLEXITY EXAMINATION:  
Most Recent Physical Functioning:  
Gross Assessment: 
AROM: Generally decreased, functional 
Strength: Generally decreased, functional 
Coordination: Generally decreased, functional 
         
  
Posture: 
Posture (WDL): Exceptions to [facility] Posture Assessment: Forward head, Rounded shoulders Balance: 
Sitting: Impaired Sitting - Static: Fair (occasional) Sitting - Dynamic: Fair (occasional) Standing: Impaired Standing - Static: Poor Standing - Dynamic : Poor Bed Mobility: 
Rolling: Maximum assistance;Assist x2 Supine to Sit: Maximum assistance;Assist x2 Scooting: Maximum assistance;Assist x2 Wheelchair Mobility: 
  
Transfers: 
Sit to Stand: Maximum assistance;Assist x2 Stand to Sit: Maximum assistance;Assist x2 Bed to Chair: Maximum assistance;Assist x2 Gait: 
  
Base of Support: Widened;Center of gravity altered Speed/Karma: Pace decreased (<100 feet/min) Step Length: Left shortened;Right shortened Gait Abnormalities: Decreased step clearance Distance (ft): 3 Feet (ft) Assistive Device: Walker, rolling Ambulation - Level of Assistance: Maximum assistance;Assist x2 Interventions: Verbal cues; Visual/Demos; Safety awareness training; Tactile cues;Manual cues Body Structures Involved: 1. Muscles Body Functions Affected: 1. Mental 
2. Movement Related Activities and Participation Affected: 1. General Tasks and Demands 2. Mobility 3. Self Care 4. Domestic Life 5. Community, Social and [place] Number of elements that affect the Plan of Care: 4+: HIGH COMPLEXITY CLINICAL PRESENTATION:  
Presentation: Evolving clinical presentation with changing clinical characteristics: MODERATE COMPLEXITY CLINICAL DECISION MAKIN [facility] [address] [address] AM-PAC 6 Clicks Basic Mobility Inpatient Short Form How much difficulty does the patient currently have. ..  Unable A Lot A Little None 1. Turning over in bed (including adjusting bedclothes, sheets and blankets)? [] 1   [x] 2   [] 3   [] 4  
2. Sitting down on and standing up from a chair with arms ( e.g., wheelchair, bedside commode, etc.)   [] 1   [x] 2   [] 3   [] 4  
3. Moving from lying on back to sitting on the side of the bed? [] 1   [x] 2   [] 3   [] 4 How much help from another person does the patient currently need. .. Total A Lot A Little None 4. Moving to and from a bed to a chair (including a wheelchair)? [x] 1   [] 2   [] 3   [] 4  
5. Need to walk in hospital room? [x] 1   [] 2   [] 3   [] 4  
6. Climbing 3-5 steps with a railing? [x] 1   [] 2   [] 3   [] 4  
© 2007, Trustees of [facility] [employer], under license to [employer]. All rights reserved Score:  Initial: 9 Most Recent: X (Date: -- ) Interpretation of Tool:  Represents activities that are increasingly more difficult (i.e. Bed mobility, Transfers, Gait). Score 24 23 22-20 19-15 14-10 9-7 6 Modifier CH CI CJ CK CL CM CN   
 
? Mobility - Walking and Moving Around:  
  - CURRENT STATUS: CM - 80%-99% impaired, limited or restricted  - GOAL STATUS: CL - 60%-79% impaired, limited or restricted  - D/C STATUS:  ---------------To be determined--------------- [place] / [place] / Product Type: Medicare /   
 
Medical Necessity:    
· Patient demonstrates fair rehab potential due to higher previous functional level. Reason for Services/Other Comments: 
· Patient continues to demonstrate capacity to improve strength, mobility, balance, transfers, activity tolerance which will increase independence, decrease amount of assistance required from caregiver and increase safety.   
Use of outcome tool(s) and clinical judgement create a POC that gives a: Questionable prediction of patient's progress: MODERATE COMPLEXITY  
  
 
 
 
TREATMENT:  
 (In addition to Assessment/Re-Assessment sessions the following treatments were rendered) Pre-treatment Symptoms/Complaints:  Pt mostly non verbal, occasionally speaking [place] Pain: Initial:  
Pain Intensity 1: 0  Post Session:  0/10 Assessment/Reassessment only, no treatment provided today Braces/Orthotics/Lines/Etc:  
· IV 
· rousseau catheter · O2 Device: Nasal cannula Treatment/Session Assessment:   
· Response to Treatment:  Pt requires max-total A for bed mobility, transfer · Interdisciplinary Collaboration:  
o Physical Therapist 
o Registered Nurse · After treatment position/precautions:  
o Up in chair 
o Bed alarm/tab alert on 
o Bed/Chair-wheels locked 
o Bed in low position 
o Call light within reach · Compliance with Program/Exercises: Will assess as treatment progresses. · Recommendations/Intent for next treatment session: \"Next visit will focus on advancements to more challenging activities and reduction in assistance provided\". Total Treatment Duration: PT Patient Time In/Time Out Time In: 1020 Time Out: 1053 Ayla Lawrence DPT

## 2018-09-18 NOTE — ED NOTES
TRANSFER - OUT REPORT: 
 
Verbal report given to 632(name) on Angel Camarena  being transferred to [facility](unit) for routine progression of care Report consisted of patients Situation, Background, Assessment and  
Recommendations(SBAR). Information from the following report(s) SBAR, ED Summary, [facility] and Recent Results was reviewed with the receiving nurse. Lines:  
Peripheral IV 09/17/18 Left Forearm (Active) Site Assessment Clean, dry, & intact 9/17/2018  6:20 PM  
Phlebitis Assessment 0 9/17/2018  6:20 PM  
Infiltration Assessment 0 9/17/2018  6:20 PM  
Dressing Status Clean, dry, & intact 9/17/2018  6:20 PM  
Dressing Type Transparent 9/17/2018  6:20 PM  
   
Peripheral IV 09/17/18 Right Antecubital (Active) Site Assessment Clean, dry, & intact 9/17/2018  6:21 PM  
Phlebitis Assessment 0 9/17/2018  6:21 PM  
Infiltration Assessment 0 9/17/2018  6:21 PM  
Dressing Status Clean, dry, & intact 9/17/2018  6:21 PM  
Dressing Type Transparent 9/17/2018  6:21 PM  
  
 
Opportunity for questions and clarification was provided. Patient transported with: 
 [employer]

## 2018-09-18 NOTE — PROGRESS NOTES
Hourly rounds completed on patient. patient lethargic all night, only respond to pain. Patient denies any needs at this time. Will report to oncoming nurse.

## 2018-09-18 NOTE — PROGRESS NOTES
09/17/18 2005 Dual Skin Pressure Injury Assessment Dual Skin Pressure Injury Assessment WDL Second Care Provider (Based on [address] [place] [address]) Martha Lawrence Dual skin assessment with Martha Lawrence, patient has no signs of skin breakdown, he has old scar on abdomen. Patient bed low locked and call light within reach.

## 2018-09-18 NOTE — PROGRESS NOTES
Hospitalist Progress Note 2018 Admit Date: 2018  5:46 PM  
NAME: Marline Luna :  3/19/1931 MRN:  [id number] Attending: Wai Slade MD 
PCP:  PROVIDER UNKNOWN 
 
SUBJECTIVE:  
Marline Luna is an 79 yo M with history of significant Alzheimer's dementia admitted yesterday with worsening cognition and chills. He is seen with Greenlandic interpretor, Lizzie Leary. He is oriented to self. Denies pain. No family at bedside, but called his daughter, Ryder Escobar, who thinks he is doing about the same. She states with his dementia that he often does not recognize her. Urine culture growing GNR. Review of Systems negative with exception of pertinent positives noted above PHYSICAL EXAM  
 
Visit Vitals  /69  Pulse 63  Temp (!) 120 °F (48.9 °C)  Resp 20  
 Ht 5' 5\" (1.651 m)  Wt 77.1 kg (170 lb)  SpO2 96%  BMI 28.29 kg/m2 Temp (24hrs), Av.6 °F (38.7 °C), Min:97.4 °F (36.3 °C), Max:120 °F (48.9 °C) Patient Vitals for the past 24 hrs: 
 Temp Pulse Resp BP SpO2  
18 1317 (!) 120 °F (48.9 °C) - - - -  
18 1316 - - - - 96 % 18 1239 97.6 °F (36.4 °C) 63 20 161/69 96 % 18 0925 - 68 - - -  
18 0757 98.3 °F (36.8 °C) (!) 54 19 140/65 96 % 18 0407 97.4 °F (36.3 °C) 63 18 150/53 94 % 18 2242 97.6 °F (36.4 °C) 61 18 145/69 94 % 18 -  17 162/83 95 % 18 - 65 23 (!) 163/94 96 % 18 -  19 186/75 94 % 18 -  16 131/60 94 % 18 - - - - 95 % 18 - 61 17 138/60 95 % 18 1936 98.5 °F (36.9 °C) - - - -  
18 1900 - 71 - 171/72 96 % 18 1829 - 66 23 138/63 96 % 18 1759 -  - 124/57 96 % 18 174 -  18 119/59 90 % 18 - - - 119/59 - Oxygen Therapy O2 Sat (%): 96 % (18 1316) Pulse via Oximetry: 61 beats per minute (181) O2 Device: Nasal cannula (18 1316) O2 Flow Rate (L/min): 2 l/min (09/18/18 1316) Intake/Output Summary (Last 24 hours) at 09/18/18 1427 Last data filed at 09/18/18 1419 Gross per 24 hour Intake             1236 ml Output                0 ml Net             1236 ml General: No acute distress, elderly, oriented to self Lungs:  CTA Bilaterally. Heart:  Regular rate and rhythm,  No murmur, rub, or gallop Abdomen: Soft, Non distended, Non tender, Positive bowel sounds Extremities: No cyanosis, clubbing or edema Neurologic:  No focal deficits Recent Results (from the past 24 hour(s)) CULTURE, BLOOD Collection Time: 09/17/18  6:13 PM  
Result Value Ref Range Special Requests: RIGHT 
ARM Culture result: NO GROWTH AFTER 10 HOURS    
CULTURE, BLOOD Collection Time: 09/17/18  6:13 PM  
Result Value Ref Range Special Requests: RIGHT Antecubital 
    
 Culture result: NO GROWTH AFTER 10 HOURS    
CBC WITH AUTOMATED DIFF Collection Time: 09/17/18  6:17 PM  
Result Value Ref Range WBC 5.2 4.3 - 11.1 K/uL  
 RBC 3.87 (L) 4.23 - 5.6 M/uL  
 HGB 13.1 (L) 13.6 - 17.2 g/dL HCT 39.1 (L) 41.1 - 50.3 % .0 (H) 79.6 - 97.8 FL  
 MCH 33.9 (H) 26.1 - 32.9 PG  
 MCHC 33.5 31.4 - 35.0 g/dL  
 RDW 13.3 % PLATELET 561 (L) 024 - 450 K/uL MPV 11.3 9.4 - 12.3 FL ABSOLUTE NRBC 0.00 0.0 - 0.2 K/uL  
 DF AUTOMATED NEUTROPHILS 48 43 - 78 % LYMPHOCYTES 36 13 - 44 % MONOCYTES 10 4.0 - 12.0 % EOSINOPHILS 5 0.5 - 7.8 % BASOPHILS 0 0.0 - 2.0 % IMMATURE GRANULOCYTES 0 0.0 - 5.0 %  
 ABS. NEUTROPHILS 2.5 1.7 - 8.2 K/UL  
 ABS. LYMPHOCYTES 1.9 0.5 - 4.6 K/UL  
 ABS. MONOCYTES 0.5 0.1 - 1.3 K/UL  
 ABS. EOSINOPHILS 0.3 0.0 - 0.8 K/UL  
 ABS. BASOPHILS 0.0 0.0 - 0.2 K/UL  
 ABS. IMM. GRANS. 0.0 0.0 - 0.5 K/UL METABOLIC PANEL, COMPREHENSIVE Collection Time: 09/17/18  6:17 PM  
Result Value Ref Range Sodium 144 136 - 145 mmol/L  Potassium 3.6 3.5 - 5.1 mmol/L  
 Chloride 108 (H) 98 - 107 mmol/L  
 CO2 27 21 - 32 mmol/L Anion gap 9 7 - 16 mmol/L Glucose 104 (H) 65 - 100 mg/dL BUN 20 8 - 23 MG/DL Creatinine 1.21 0.8 - 1.5 MG/DL  
 GFR est AA >60 >60 ml/min/1.73m2 GFR est non-AA >60 >60 ml/min/1.73m2 Calcium 8.6 8.3 - 10.4 MG/DL Bilirubin, total 0.3 0.2 - 1.1 MG/DL  
 ALT (SGPT) 17 12 - 65 U/L  
 AST (SGOT) 18 15 - 37 U/L Alk. phosphatase 48 (L) 50 - 136 U/L Protein, total 6.5 6.3 - 8.2 g/dL Albumin 3.1 (L) 3.2 - 4.6 g/dL Globulin 3.4 2.3 - 3.5 g/dL A-G Ratio 0.9 (L) 1.2 - 3.5 PROCALCITONIN Collection Time: 09/17/18  6:17 PM  
Result Value Ref Range Procalcitonin <0.1 ng/mL CK Collection Time: 09/17/18  6:17 PM  
Result Value Ref Range  (H) 21 - 215 U/L  
MAGNESIUM Collection Time: 09/17/18  6:17 PM  
Result Value Ref Range Magnesium 2.1 1.8 - 2.4 mg/dL AMMONIA Collection Time: 09/17/18  6:17 PM  
Result Value Ref Range Ammonia 28 11 - 32 UMOL/L  
POC TROPONIN-I Collection Time: 09/17/18  6:25 PM  
Result Value Ref Range Troponin-I (POC) 0.04 0.02 - 0.05 ng/ml POC LACTIC ACID Collection Time: 09/17/18  6:28 PM  
Result Value Ref Range Lactic Acid (POC) 1.5 0.5 - 1.9 mmol/L  
URINALYSIS W/ RFLX MICROSCOPIC Collection Time: 09/17/18  6:49 PM  
Result Value Ref Range Color YELLOW Appearance CLEAR Specific gravity 1.021 1.001 - 1.023    
 pH (UA) 7.0 5.0 - 9.0 Protein 100 (A) NEG mg/dL Glucose NEGATIVE  mg/dL Ketone NEGATIVE  NEG mg/dL Bilirubin NEGATIVE  NEG Blood NEGATIVE  NEG Urobilinogen 1.0 0.2 - 1.0 EU/dL Nitrites POSITIVE (A) NEG Leukocyte Esterase MODERATE (A) NEG    
 WBC  0 /hpf  
 RBC 0-3 0 /hpf Epithelial cells 0 0 /hpf Bacteria 4+ (H) 0 /hpf Casts 5-10 0 /lpf DRUG SCREEN, URINE Collection Time: 09/17/18  6:49 PM  
Result Value Ref Range PCP(PHENCYCLIDINE) NEGATIVE  BENZODIAZEPINES NEGATIVE     
 COCAINE NEGATIVE AMPHETAMINES NEGATIVE METHADONE NEGATIVE     
 THC (TH-CANNABINOL) NEGATIVE     
 OPIATES NEGATIVE     
 BARBITURATES NEGATIVE CULTURE, URINE Collection Time: 09/17/18  6:53 PM  
Result Value Ref Range Special Requests: GREY TUBE Culture result: GRAM NEGATIVE RODS SUBCULTURE IN PROGRESS (A)    
EKG, 12 LEAD, INITIAL Collection Time: 09/17/18  6:55 PM  
Result Value Ref Range Ventricular Rate 64 BPM  
 Atrial Rate 64 BPM  
 P-R Interval 202 ms QRS Duration 68 ms Q-T Interval 398 ms QTC Calculation (Bezet) 410 ms Calculated P Axis 73 degrees Calculated R Axis 54 degrees Calculated T Axis -101 degrees Diagnosis    
  !! AGE AND GENDER SPECIFIC ECG ANALYSIS !! Normal sinus rhythm Septal infarct , age undetermined ST & Marked T wave abnormality, consider anterolateral ischemia Abnormal ECG When compared with ECG of 27-OCT-2017 14:13, 
T wave inversion less evident in Lateral leads Confirmed by Janet Barrera ([id number]) on 9/17/2018 9:43:58 PM 
  
GLUCOSE, POC Collection Time: 09/18/18  8:06 AM  
Result Value Ref Range Glucose (POC) 82 65 - 100 mg/dL GLUCOSE, POC Collection Time: 09/18/18 11:43 AM  
Result Value Ref Range Glucose (POC) 89 65 - 100 mg/dL All Micro Results Procedure Component Value Units Date/Time CULTURE, URINE [[id number]]  (Abnormal) Collected:  09/17/18 1853 Order Status:  Completed Specimen:  Urine from Cath Urine Updated:  09/18/18 1139 Special Requests: GREY TUBE Culture result:      
  GRAM NEGATIVE RODS SUBCULTURE IN PROGRESS (A) CULTURE, BLOOD [[id number]] Collected:  09/17/18 1813 Order Status:  Completed Specimen:  Blood from Blood Updated:  09/18/18 8901 Special Requests: --     
  RIGHT 
ARM Culture result: NO GROWTH AFTER 10 HOURS     
 CULTURE, BLOOD [[id number]] Collected:  09/17/18 1813 Order Status:  Completed Specimen:  Blood from Blood Updated:  09/18/18 1113 Special Requests: --     
  RIGHT Antecubital 
  
  Culture result: NO GROWTH AFTER 10 HOURS     
  
 
 
ASSESSMENT Hospital Problems as of 9/18/2018  Date Reviewed: 9/17/2018 Codes Class Noted - Resolved POA  
 UTI (urinary tract infection) ICD-10-CM: N39.0 ICD-9-CM: 599.0  9/17/2018 - Present Unknown * (Principal)Acute metabolic encephalopathy SJO-98-IN: G93.41 
ICD-9-CM: 348.31  9/17/2018 - Present Unknown Alzheimer disease ICD-10-CM: G30.9, F02.80 ICD-9-CM: 331.0  Unknown - Present Yes  
   
 CHF (congestive heart failure) (HCC) ICD-10-CM: I50.9 ICD-9-CM: 428.0  Unknown - Present Yes Hypothyroidism ICD-10-CM: E03.9 ICD-9-CM: 244.9  Unknown - Present Yes Dyslipidemia ICD-10-CM: E78.5 ICD-9-CM: 272.4  Unknown - Present Yes Carotid artery stenosis ICD-10-CM: I65.29 ICD-9-CM: 433.10  Unknown - Present Yes Prostate cancer [facility]) ICD-10-CM: H98 ICD-9-CM: 185  Unknown - Present Yes Diabetes ([facility] ICD-10-CM: E11.9 ICD-9-CM: 250.00  Unknown - Present Yes Arteriosclerotic heart disease ICD-10-CM: I25.10 ICD-9-CM: 414.00  Unknown - Present Yes Atrial fibrillation with RVR ([facility] ICD-10-CM: I48.91 
ICD-9-CM: 427.31  11/12/2016 - Present Yes Dementia ICD-10-CM: F03.90 ICD-9-CM: 294.20  11/12/2016 - Present Yes Plan: · GNR UTI- continue rocephin. Await cultures. If clinically the same or improved, likely discharge back to SNF tomorrow. · Remove rousseau catheter. · Stop IVF- his daughter states he is eating and drinking well. · Dementia- stable. On Exelon and namenda. · Acute encephalopathy- related to UTI. DVT Prophylaxis: SCDs Signed By: Debbie Mata MD   
 September 18, 2018

## 2018-09-18 NOTE — PROGRESS NOTES
TRANSFER - IN REPORT: 
 
Verbal report received from [facility] on Rossi Mathis  being received from ED for routine progression of care Report consisted of patients Situation, Background, Assessment and  
Recommendations(SBAR). Information from the following report(s) SBAR, Kardex and ED Summary was reviewed with the receiving nurse. Opportunity for questions and clarification was provided. Assessment completed upon patients arrival to unit and care assumed.

## 2018-09-18 NOTE — PROGRESS NOTES
Call to Dr. Niesha Baeza, informed pt continues to be restless and yelling after removing rousseau. Informed MD pt spit po meds out. New order received Ativan 0.5mg IV once.

## 2018-09-19 LAB
GLUCOSE BLD STRIP.AUTO-MCNC: 114 MG/DL (ref 65–100)
GLUCOSE BLD STRIP.AUTO-MCNC: 125 MG/DL (ref 65–100)
GLUCOSE BLD STRIP.AUTO-MCNC: 75 MG/DL (ref 65–100)
GLUCOSE BLD STRIP.AUTO-MCNC: 79 MG/DL (ref 65–100)

## 2018-09-19 PROCEDURE — 65270000029 HC RM PRIVATE

## 2018-09-19 PROCEDURE — 74011000258 HC RX REV CODE- 258: Performed by: INTERNAL MEDICINE

## 2018-09-19 PROCEDURE — 74011250636 HC RX REV CODE- 250/636: Performed by: INTERNAL MEDICINE

## 2018-09-19 PROCEDURE — 99218 HC RM OBSERVATION: CPT

## 2018-09-19 PROCEDURE — 82962 GLUCOSE BLOOD TEST: CPT

## 2018-09-19 PROCEDURE — 94760 N-INVAS EAR/PLS OXIMETRY 1: CPT

## 2018-09-19 PROCEDURE — 74011250637 HC RX REV CODE- 250/637: Performed by: INTERNAL MEDICINE

## 2018-09-19 PROCEDURE — 77010033678 HC OXYGEN DAILY

## 2018-09-19 RX ORDER — CEFPODOXIME PROXETIL 200 MG/1
100 TABLET, FILM COATED ORAL EVERY 12 HOURS
Status: DISCONTINUED | OUTPATIENT
Start: 2018-09-20 | End: 2018-09-20 | Stop reason: HOSPADM

## 2018-09-19 RX ADMIN — HEPARIN SODIUM 5000 UNITS: 5000 INJECTION INTRAVENOUS; SUBCUTANEOUS at 22:08

## 2018-09-19 RX ADMIN — MEMANTINE HYDROCHLORIDE 5 MG: 5 TABLET ORAL at 17:49

## 2018-09-19 RX ADMIN — DOCUSATE SODIUM 100 MG: 100 CAPSULE, LIQUID FILLED ORAL at 17:49

## 2018-09-19 RX ADMIN — VALPROIC ACID 250 MG: 250 SOLUTION ORAL at 17:49

## 2018-09-19 RX ADMIN — CEFTRIAXONE SODIUM 1 G: 1 INJECTION, POWDER, FOR SOLUTION INTRAMUSCULAR; INTRAVENOUS at 21:44

## 2018-09-19 NOTE — PROGRESS NOTES
Interdisciplinary Rounds completed 09/19/18. Nursing, Case Management, Physician and PT present. Plan of care reviewed and updated.

## 2018-09-19 NOTE — PROGRESS NOTES
Order received, chart reviewed. Unable to obtain an  to complete OT evaluation. Per nursing notes, pt is A&O to self only and has been refusing meds. Will follow up for evaluation as schedule allows.  
 
Sathya Justin, OT

## 2018-09-19 NOTE — PHYSICIAN ADVISORY
Letter of Determination: Upgrade from Observation to Inpatient Status This patient was originally hospitalized as Outpatient Status with Observation Services on 9/17/2018 for acute metabolic encephalopathy. This patient now meets for Inpatient Admission in accordance with CMS regulation Section 43 .3. Specifically, patient's stay is now over Two Midnights and was medically necessary. The patient's stay was medically necessary based on extreme advanced age, continued confusion. Consistent with CMS guidelines, patient meets for inpatient status. It is our recommendation that this patient's hospitalization status should be upgraded from OBSERVATION to INPATIENT status.  
  
The final decision regarding the patient's hospitalization status depends on the attending physician's judgement. Dominique Kimball MD, BROWN, Physician Advisor [phone] [facility].

## 2018-09-19 NOTE — PROGRESS NOTES
Hospitalist Progress Note 2018 Admit Date: 2018  5:46 PM  
NAME: Ena Morton :  3/19/1931 MRN:  [id number] Attending: Rene Estrada MD 
PCP:  PROVIDER UNKNOWN 
 
SUBJECTIVE:  
Ena Morton is an 81 yo M with history of significant Alzheimer's dementia admitted yesterday with worsening cognition and chills. He is seen with Yoruba interpretor, Verona Tavarez. He is oriented to self. Denies pain. No family at bedside, but called his daughter, Esme Ramirez, who thinks he is doing about the same. She states with his dementia that he often does not recognize her. Urine culture growing GNR. 
 
- seen with daughter, Itzel Sims, and TIM, Matheus Allen. He is groggy. They state she has not really opened his eyes for them, though he ate a good lunch. Urine culture with GNR (results pending). Review of Systems negative with exception of pertinent positives noted above PHYSICAL EXAM  
 
Visit Vitals  /54 (BP 1 Location: Right arm, BP Patient Position: At rest)  Pulse (!) 52  Temp 97.7 °F (36.5 °C)  Resp 17  Ht 5' 5\" (1.651 m)  Wt 70.6 kg (155 lb 9.6 oz)  SpO2 96%  BMI 25.89 kg/m2 Temp (24hrs), Av.1 °F (36.7 °C), Min:97.6 °F (36.4 °C), Max:98.6 °F (37 °C) Patient Vitals for the past 24 hrs: 
 Temp Pulse Resp BP SpO2  
18 1505 - - - - 96 % 18 1500 97.7 °F (36.5 °C) (!) 52 17 150/54 95 % 18 1113 97.8 °F (36.6 °C) (!) 53 18 146/60 93 % 18 0724 98.2 °F (36.8 °C) (!) 59 20 149/64 93 % 18 0538 97.6 °F (36.4 °C) 60 20 129/56 91 % 18 2313 98.5 °F (36.9 °C) 66 20 138/60 95 % 18 1934 °F (36.9 °C) 63 20 154/62 92 % 18 1555 98.6 °F (37 °C) 73 20 138/72 94 % Oxygen Therapy O2 Sat (%): 96 % (18 1505) Pulse via Oximetry: 61 beats per minute (18) O2 Device: Nasal cannula (18 1505) O2 Flow Rate (L/min): 2 l/min (18 1505) Intake/Output Summary (Last 24 hours) at 18 2588 Last data filed at 09/18/18 1905 Gross per 24 hour Intake              120 ml Output             1800 ml Net            -1680 ml General: No acute distress, elderly, groggy Lungs:  CTA Bilaterally. Heart:  Regular rate and rhythm,  No murmur, rub, or gallop Abdomen: Soft, Non distended, Non tender, Positive bowel sounds Extremities: No cyanosis, clubbing or edema Neurologic:  No focal deficits Recent Results (from the past 24 hour(s)) GLUCOSE, POC Collection Time: 09/18/18  4:23 PM  
Result Value Ref Range Glucose (POC) 101 (H) 65 - 100 mg/dL GLUCOSE, POC Collection Time: 09/19/18  7:20 AM  
Result Value Ref Range Glucose (POC) 79 65 - 100 mg/dL GLUCOSE, POC Collection Time: 09/19/18 10:59 AM  
Result Value Ref Range Glucose (POC) 75 65 - 100 mg/dL All Micro Results Procedure Component Value Units Date/Time CULTURE, BLOOD [[id number]] Collected:  09/17/18 1813 Order Status:  Completed Specimen:  Blood from Blood Updated:  09/19/18 1225 Special Requests: --     
  RIGHT 
ARM Culture result: NO GROWTH 2 DAYS     
 CULTURE, BLOOD [[id number]] Collected:  09/17/18 1813 Order Status:  Completed Specimen:  Blood from Blood Updated:  09/19/18 1225 Special Requests: --     
  RIGHT Antecubital 
  
  Culture result: NO GROWTH 2 DAYS     
 CULTURE, URINE [[id number]]  (Abnormal) Collected:  09/17/18 1853 Order Status:  Completed Specimen:  Urine from Cath Urine Updated:  09/19/18 8186 Special Requests: GREY TUBE Culture result:      
  >100,000 COLONIES/mL GRAM NEGATIVE RODS IDENTIFICATION AND SUSCEPTIBILITY TO FOLLOW (A) ASSESSMENT Hospital Problems as of 9/19/2018  Date Reviewed: 9/17/2018 Codes Class Noted - Resolved POA  
 UTI (urinary tract infection) ICD-10-CM: N39.0 ICD-9-CM: 599.0  9/17/2018 - Present Unknown  * (Principal)Acute metabolic encephalopathy FLG-38-RA: G93.41 
 ICD-9-CM: 348.31  9/17/2018 - Present Unknown Alzheimer disease ICD-10-CM: G30.9, F02.80 ICD-9-CM: 331.0  Unknown - Present Yes  
   
 CHF (congestive heart failure) (HCC) ICD-10-CM: I50.9 ICD-9-CM: 428.0  Unknown - Present Yes Hypothyroidism ICD-10-CM: E03.9 ICD-9-CM: 244.9  Unknown - Present Yes Dyslipidemia ICD-10-CM: E78.5 ICD-9-CM: 272.4  Unknown - Present Yes Carotid artery stenosis ICD-10-CM: I65.29 ICD-9-CM: 433.10  Unknown - Present Yes Prostate cancer [facility]) ICD-10-CM: G91 ICD-9-CM: 185  Unknown - Present Yes Diabetes ([facility] [address] ICD-10-CM: E11.9 ICD-9-CM: 250.00  Unknown - Present Yes Arteriosclerotic heart disease ICD-10-CM: I25.10 ICD-9-CM: 414.00  Unknown - Present Yes Atrial fibrillation with RVR ([facility] ICD-10-CM: I48.91 
ICD-9-CM: 427.31  11/12/2016 - Present Yes Dementia ICD-10-CM: F03.90 ICD-9-CM: 294.20  11/12/2016 - Present Yes Plan: · GNR UTI- continue rocephin (day 3) and switch to PO vantin tomorrow. · Await cultures. · Dementia- stable. On Exelon and namenda. · Acute encephalopathy- related to UTI. Dispo- to nursing home tomorrow. DVT Prophylaxis: SCDs Signed By: Yaron Swenson MD   
 September 19, 2018 Saw patient later in the day with his daughter, Felicia Cintron, at bedside. He was much more awake and alert. She states he is looking much better. Discussed consideration of hospice evaluation at his facility due to his progressing dementia.

## 2018-09-20 VITALS
RESPIRATION RATE: 18 BRPM | TEMPERATURE: 97.7 F | DIASTOLIC BLOOD PRESSURE: 62 MMHG | OXYGEN SATURATION: 91 % | HEART RATE: 69 BPM | WEIGHT: 159.61 LBS | BODY MASS INDEX: 26.59 KG/M2 | HEIGHT: 65 IN | SYSTOLIC BLOOD PRESSURE: 138 MMHG

## 2018-09-20 LAB
ANION GAP SERPL CALC-SCNC: 7 MMOL/L (ref 7–16)
BACTERIA SPEC CULT: ABNORMAL
BASOPHILS # BLD: 0 K/UL (ref 0–0.2)
BASOPHILS NFR BLD: 1 % (ref 0–2)
BUN SERPL-MCNC: 16 MG/DL (ref 8–23)
CALCIUM SERPL-MCNC: 8.4 MG/DL (ref 8.3–10.4)
CHLORIDE SERPL-SCNC: 104 MMOL/L (ref 98–107)
CO2 SERPL-SCNC: 30 MMOL/L (ref 21–32)
CREAT SERPL-MCNC: 1.08 MG/DL (ref 0.8–1.5)
DIFFERENTIAL METHOD BLD: ABNORMAL
EOSINOPHIL # BLD: 0.4 K/UL (ref 0–0.8)
EOSINOPHIL NFR BLD: 7 % (ref 0.5–7.8)
ERYTHROCYTE [DISTWIDTH] IN BLOOD BY AUTOMATED COUNT: 12.8 %
GLUCOSE BLD STRIP.AUTO-MCNC: 105 MG/DL (ref 65–100)
GLUCOSE BLD STRIP.AUTO-MCNC: 66 MG/DL (ref 65–100)
GLUCOSE BLD STRIP.AUTO-MCNC: 73 MG/DL (ref 65–100)
GLUCOSE SERPL-MCNC: 80 MG/DL (ref 65–100)
HCT VFR BLD AUTO: 42.1 % (ref 41.1–50.3)
HGB BLD-MCNC: 13.7 G/DL (ref 13.6–17.2)
IMM GRANULOCYTES # BLD: 0 K/UL (ref 0–0.5)
IMM GRANULOCYTES NFR BLD AUTO: 0 % (ref 0–5)
LYMPHOCYTES # BLD: 2.6 K/UL (ref 0.5–4.6)
LYMPHOCYTES NFR BLD: 43 % (ref 13–44)
MCH RBC QN AUTO: 33.3 PG (ref 26.1–32.9)
MCHC RBC AUTO-ENTMCNC: 32.5 G/DL (ref 31.4–35)
MCV RBC AUTO: 102.2 FL (ref 79.6–97.8)
MONOCYTES # BLD: 0.6 K/UL (ref 0.1–1.3)
MONOCYTES NFR BLD: 11 % (ref 4–12)
NEUTS SEG # BLD: 2.4 K/UL (ref 1.7–8.2)
NEUTS SEG NFR BLD: 39 % (ref 43–78)
NRBC # BLD: 0 K/UL (ref 0–0.2)
PLATELET # BLD AUTO: 157 K/UL (ref 150–450)
PMV BLD AUTO: 10.6 FL (ref 9.4–12.3)
POTASSIUM SERPL-SCNC: 3.7 MMOL/L (ref 3.5–5.1)
RBC # BLD AUTO: 4.12 M/UL (ref 4.23–5.6)
SERVICE CMNT-IMP: ABNORMAL
SODIUM SERPL-SCNC: 141 MMOL/L (ref 136–145)
WBC # BLD AUTO: 6 K/UL (ref 4.3–11.1)

## 2018-09-20 PROCEDURE — 80048 BASIC METABOLIC PNL TOTAL CA: CPT

## 2018-09-20 PROCEDURE — 74011250636 HC RX REV CODE- 250/636: Performed by: INTERNAL MEDICINE

## 2018-09-20 PROCEDURE — 82962 GLUCOSE BLOOD TEST: CPT

## 2018-09-20 PROCEDURE — 36415 COLL VENOUS BLD VENIPUNCTURE: CPT

## 2018-09-20 PROCEDURE — 85025 COMPLETE CBC W/AUTO DIFF WBC: CPT

## 2018-09-20 PROCEDURE — 74011250637 HC RX REV CODE- 250/637: Performed by: INTERNAL MEDICINE

## 2018-09-20 PROCEDURE — 97530 THERAPEUTIC ACTIVITIES: CPT

## 2018-09-20 PROCEDURE — 97167 OT EVAL HIGH COMPLEX 60 MIN: CPT

## 2018-09-20 RX ORDER — CEFPODOXIME PROXETIL 100 MG/1
100 TABLET, FILM COATED ORAL EVERY 12 HOURS
Qty: 4 TAB | Refills: 0 | Status: SHIPPED
Start: 2018-09-20 | End: 2018-09-22

## 2018-09-20 RX ADMIN — FUROSEMIDE 20 MG: 20 TABLET ORAL at 11:19

## 2018-09-20 RX ADMIN — MEMANTINE HYDROCHLORIDE 5 MG: 5 TABLET ORAL at 16:48

## 2018-09-20 RX ADMIN — TAMSULOSIN HYDROCHLORIDE 0.4 MG: 0.4 CAPSULE ORAL at 11:20

## 2018-09-20 RX ADMIN — CEFPODOXIME PROXETIL 100 MG: 200 TABLET, FILM COATED ORAL at 11:18

## 2018-09-20 RX ADMIN — ONDANSETRON 4 MG: 2 INJECTION INTRAMUSCULAR; INTRAVENOUS at 13:16

## 2018-09-20 RX ADMIN — VALPROIC ACID 250 MG: 250 SOLUTION ORAL at 16:49

## 2018-09-20 RX ADMIN — METOPROLOL SUCCINATE 12.5 MG: 25 TABLET, EXTENDED RELEASE ORAL at 11:19

## 2018-09-20 RX ADMIN — ESCITALOPRAM OXALATE 10 MG: 10 TABLET ORAL at 11:18

## 2018-09-20 RX ADMIN — HEPARIN SODIUM 5000 UNITS: 5000 INJECTION INTRAVENOUS; SUBCUTANEOUS at 11:20

## 2018-09-20 RX ADMIN — VALPROIC ACID 250 MG: 250 SOLUTION ORAL at 11:21

## 2018-09-20 RX ADMIN — AMLODIPINE BESYLATE 5 MG: 5 TABLET ORAL at 11:21

## 2018-09-20 RX ADMIN — MEMANTINE HYDROCHLORIDE 5 MG: 5 TABLET ORAL at 11:20

## 2018-09-20 RX ADMIN — ASPIRIN 81 MG: 81 TABLET, COATED ORAL at 11:20

## 2018-09-20 RX ADMIN — DOCUSATE SODIUM 100 MG: 100 CAPSULE, LIQUID FILLED ORAL at 16:49

## 2018-09-20 RX ADMIN — DOCUSATE SODIUM 100 MG: 100 CAPSULE, LIQUID FILLED ORAL at 11:21

## 2018-09-20 RX ADMIN — LOSARTAN POTASSIUM 100 MG: 50 TABLET ORAL at 11:19

## 2018-09-20 NOTE — PROGRESS NOTES
Patient to discharge to [facility] rehab room 604. Report line, [phone], given to [facility]. Phillip Shetty transportation setup for 4:30pm. Family is aware and in agreement.

## 2018-09-20 NOTE — DISCHARGE SUMMARY
Hospitalist Discharge Summary     Patient ID:  Raymond Mark  [id number]  27 y.o.  3/19/1931  Admit date: 9/17/2018  5:46 PM  Discharge date and time: 9/20/2018  Attending: Shen Dumont MD  PCP:  PROVIDER UNKNOWN  Treatment Team: Attending Provider: Shen Dumont MD; Care Manager: Shazia Alicea, ANNELISE; Utilization Review: Humberto Nino    Principal Diagnosis UTI (urinary tract infection)   Hospital Problems as of 9/20/2018  Date Reviewed: 9/17/2018          Codes Class Noted - Resolved POA    * (Principal)UTI (urinary tract infection) ICD-10-CM: N39.0  ICD-9-CM: 599.0  9/17/2018 - Present Yes        Acute metabolic encephalopathy ACG-34-XD: G93.41  ICD-9-CM: 348.31  9/17/2018 - Present Unknown        Alzheimer disease ICD-10-CM: G30.9, F02.80  ICD-9-CM: 331.0  Unknown - Present Yes        CHF (congestive heart failure) ([facility] ICD-10-CM: I50.9  ICD-9-CM: 428.0  Unknown - Present Yes        Hypothyroidism ICD-10-CM: E03.9  ICD-9-CM: 244.9  Unknown - Present Yes        Dyslipidemia ICD-10-CM: E78.5  ICD-9-CM: 272.4  Unknown - Present Yes        Carotid artery stenosis ICD-10-CM: I65.29  ICD-9-CM: 433.10  Unknown - Present Yes        Prostate cancer ([facility] ICD-10-CM: C61  ICD-9-CM: 185  Unknown - Present Yes        Diabetes ([facility] ICD-10-CM: E11.9  ICD-9-CM: 250.00  Unknown - Present Yes        Arteriosclerotic heart disease ICD-10-CM: I25.10  ICD-9-CM: 414.00  Unknown - Present Yes        Atrial fibrillation with RVR ([facility] ICD-10-CM: I48.91  ICD-9-CM: 427.31  11/12/2016 - Present Yes        Dementia ICD-10-CM: F03.90  ICD-9-CM: 294.20  11/12/2016 - Present Yes                 HPI: 'Raymond Mark is a 80 y.o. male who has a PMH of prostate cancer s/p prostatectomy on lupron injections, HTN, Alzheimer's dementia, chronic systolic HFpEF, HTN, P afib not on [facility], CKD stage III, CAD, hypothyroidism, depression, BPH who was referred from [facility] rehab after he was noted confused more than his baseline, decreased appetite, with chills for the pas 5 days. His daughter was present in the room and she provided all the information. She has noted dark colored stools as well, but patient is on chronic iron supplements, his HH remains stable. Denies fever, falls, vomiting, abdominal pain, nor diarrhea. Upon arrival to ER VS were stable. He was noted confused. Labs: normal troponin, lactic acid, no leukocytosis, chemistry unremarkable, ammonia 28, normal PCT. UA WBC . CRX and brain ct unrevealing. He received iv rocephin and hospitalist was contacted for admission due to acute metabolic encephalopathy secondary to UTI.'    Hospital Course:  1. Proteus UTI- treated with IV rocephin and changed to vantin. Treat thought 9/21 to complete 5 day course. 2. Encephalopathy on dementia- worsened by UTI. Mentation improved close to baseline per his daughter. Given advancing dementia, his family would like to consider hospice evaluation on return to SNF. Significant Diagnostic Studies:       Labs: Results:       Chemistry Recent Labs      09/20/18   0538  09/17/18 1817   GLU  80  104*   NA  141  144   K  3.7  3.6   CL  104  108*   CO2  30  27   BUN  16  20   CREA  1.08  1.21   CA  8.4  8.6   AGAP  7  9   AP   --   48*   TP   --   6.5   ALB   --   3.1*   GLOB   --   3.4   AGRAT   --   0.9*      CBC w/Diff Recent Labs      09/20/18   0538  09/17/18 1817   WBC  6.0  5.2   RBC  4.12*  3.87*   HGB  13.7  13.1*   HCT  42.1  39.1*   PLT  157  108*   GRANS  39*  48   LYMPH  43  36   EOS  7  5      Cardiac Enzymes Recent Labs      09/17/18   1817   CPK  284*      Coagulation No results for input(s): PTP, INR, APTT in the last 72 hours. No lab exists for component: INREXT    Lipid Panel No results found for: CHOL, CHOLPOCT, CHOLX, CHLST, CHOLV, 439566, HDL, LDL, LDLC, DLDLP, 199177, VLDLC, VLDL, TGLX, TRIGL, TRIGP, TGLPOCT, CHHD, CHHDX   BNP No results for input(s): BNPP in the last 72 hours.    Liver Enzymes Recent Labs 09/17/18   1817   TP  6.5   ALB  3.1*   AP  48*   SGOT  18      Thyroid Studies Lab Results   Component Value Date/Time    TSH 4.930 (H) 11/12/2016 11:15 AM            Discharge Exam:  Visit Vitals    /74 (BP 1 Location: Right arm, BP Patient Position: At rest)    Pulse (!) 50    Temp 96.8 °F (36 °C)    Resp 18    Ht 5' 5\" (1.651 m)    Wt 72.4 kg (159 lb 9.8 oz)    SpO2 96%    BMI 26.56 kg/m2     Patient Vitals for the past 24 hrs:   Temp Pulse Resp BP SpO2   09/20/18 1443 97.7 °F (36.5 °C) 69 18 138/62 91 %   09/20/18 1108 96.8 °F (36 °C) (!) 50 18 172/74 96 %   09/20/18 0745 97.5 °F (36.4 °C) (!) 51 18 158/71 94 %   09/19/18 2002 97.8 °F (36.6 °C) 73 18 156/62 97 %   09/19/18 1505 - - - - 96 %   09/19/18 1500 97.7 °F (36.5 °C) (!) 52 17 150/54 95 %       General appearance: alert, cooperative, no distress, appears stated age, confused at baseline  Lungs: clear to auscultation bilaterally  Heart: regular rate and rhythm, S1, S2 normal, no murmur, click, rub or gallop  Abdomen: soft, non-tender. Bowel sounds normal. No masses,  no organomegaly  Extremities: no cyanosis or edema  Neurologic: Grossly normal    Disposition: SNF  Discharge Condition: stable  Patient Instructions:   Current Discharge Medication List      START taking these medications    Details   cefpodoxime (VANTIN) 100 mg tablet Take 1 Tab by mouth every twelve (12) hours for 2 days. Indications: PROTEUS URINARY TRACT INFECTION  Qty: 4 Tab, Refills: 0           DISCHARGE MED LIST- DID NOT HAVE ACCURATE MED LIST FROM FACILITY AFTER MULTIPLE ATTEMPTS TO GET ACCURATE MAR- HE SHOULD RESUME PREVIOUS MEDICATIONS PRIOR TO ADMISSION AT THE DISCRETION OF TREATING PHYSICIAN AT [address] [facility]    Activity: Activity as tolerated  Diet: Cardiac Diet      Code status:  DNR    Discharge plan discussed with daughter, Shae Lira, at bedside.     Follow-up      Follow-up Appointments   Procedures    FOLLOW UP VISIT Appointment in: Other ([id number]) Physician and hospice group at SNF. Physician and hospice group at SNF. Standing Status:   Standing     Number of Occurrences:   1     Order Specific Question:   Appointment in     Answer: Other (Specify)   ·   ·   Time spent to discharge patient 31 minutes  Signed:  Edgardo Crespo.  Arnulfo Chavez MD  9/20/2018  2:35 PM

## 2018-09-20 NOTE — PROGRESS NOTES
present at bedside during assessment with James Meléndez OT. Thank you for this referral,   
 
[facility]  /[facility]. [address]   
[phone]

## 2018-09-20 NOTE — PROGRESS NOTES
Report given to Yandel Romo RN at [facility] [phone]. Call back number left with nurse.

## 2018-09-20 NOTE — PROGRESS NOTES
Problem: Self Care Deficits Care Plan (Adult) Goal: *Acute Goals and Plan of Care (Insert Text) 1. Patient will complete upper body bathing and dressing with moderate assistance and adaptive equipment as needed. 2. Patient will complete self-feeding/grooming tasks with minimal assistance to improve participation with self-care. 3. Patient will tolerate 25 minutes of OT treatment with 2-3 rest breaks to increase activity tolerance for ADLs. 4. Patient will complete functional transfers to chair/BSC with moderate assistance and adaptive equipment as needed. 5. Patient will be alert and follow simple one step commands 75% of the session to improve participation with ADL. Timeframe: 7 visits OCCUPATIONAL THERAPY: Initial Assessment, Treatment Day: 1st and AM 9/20/2018 INPATIENT: Hospital Day: 4 [place] / [place] / Product Type: Medicare /  
  
NAME/AGE/GENDER: Kathy Moran is a 80 y.o. male PRIMARY DIAGNOSIS:  UTI (urinary tract infection) Acute metabolic encephalopathy UTI (urinary tract infection) Acute metabolic encephalopathy Acute metabolic encephalopathy Acute metabolic encephalopathy ICD-10: Treatment Diagnosis:  
 · Generalized Muscle Weakness (M62.81) · Other lack of cordination (R27.8) Precautions/Allergies: 
   Review of patient's allergies indicates no known allergies. ASSESSMENT:  
 
Mr. Yaritza Botello presents to the hospital with acute metabolic encephalopathy and UTI. Pt is Latvian speaking and interpretor was at bedside to assist. Pt is supine at bedside with student helping him to bathe. Pt is able to state his name but unable to provide any history due to confusion and drowsiness. Pt mumbles and is difficult to understand at times. Pt does have a hx of dementia and is a long term resident at a nursing home. Pt needed maximal assistance will rolling and feels very stiff all over.  Pt agreeable to sitting edge of bed and required maximal assistance x 2 for bed mobility. Once sitting edge of bed pt was able to sit at midline in propped sitting and CGA/min A. Pt is drowsy and needs repeated cues to open his eyes. His upper extremities are shaky is sitting. Pt washed face with maximal assistance needing hand over hand assistance to guide washcloth to his face. Pt c/o pain in his chest and mouth. Pt is currently wearing nasal cannula but reports this is not typical. Pt stood at the edge of the bed with moderate assistance x 2 using a rolling walker. Pt was able to shift his weight and take a small step in standing. Pt did not follow cues to return to sitting and needed maximal cueing and assistance. Pt returned to supine at end of session due to safety. Pt is currently limited significantly by cognition, but unsure of baseline. Pt reports he sits in a chair at the nursing home. Pt is currently limited with ADL/functional transfers and will benefit from OT services to address stated goals and plan of care. This section established at most recent assessment PROBLEM LIST (Impairments causing functional limitations): 1. Decreased Strength 2. Decreased ADL/Functional Activities 3. Decreased Transfer Abilities 4. Decreased Ambulation Ability/Technique 5. Decreased Balance 6. Increased Pain 7. Decreased Activity Tolerance 8. Increased Fatigue 9. Decreased Flexibility/Joint Mobility 10. Decreased [place] with Home Exercise Program 
11. Decreased Cognition INTERVENTIONS PLANNED: (Benefits and precautions of occupational therapy have been discussed with the patient.) 1. Activities of daily living training 2. Adaptive equipment training 3. Balance training 4. Clothing management 5. Cognitive training 6. Donning&doffing training 7. Neuromuscular re-eduation 8. Therapeutic activity 9. Therapeutic exercise TREATMENT PLAN: Frequency/Duration: Follow patient 3 times per week to address above goals. Rehabilitation Potential For Stated Goals: Fair RECOMMENDED REHABILITATION/EQUIPMENT: (at time of discharge pending progress): Due to the probability of continued deficits (see above) this patient will likely need continued skilled occupational therapy after discharge. Equipment: ? TBD  
    
 
 
 
OCCUPATIONAL PROFILE AND HISTORY:  
History of Present Injury/Illness (Reason for Referral): 
See H&P Past Medical History/Comorbidities: Mr. 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 Mr. Raymond Gan  has a past surgical history that includes hx hemorrhoidectomy; hx prostatectomy; and hx cholecystectomy. Social History/Living Environment:  
Home Environment: Skilled nursing facility One/Two Story Residence: One story Living Alone: No 
Support Systems: Family member(s) Patient Expects to be Discharged to[de-identified] Skilled nursing facility Prior Level of Function/Work/Activity: Pt was admitted from nursing home. Pt is confused and unable to provide hx. Personal Factors:   
      Age:  80 y.o. Overall Behavior:  Confused; drowsy; poor communication even with interpretor; limited command following Other factors that influence how disability is experienced by the patient:  Multiple co-morbidities Number of Personal Factors/Comorbidities that affect the Plan of Care: Extensive review of physical, cognitive, and psychosocial performance (3+): HIGH COMPLEXITY ASSESSMENT OF OCCUPATIONAL PERFORMANCE[de-identified]  
Activities of Daily Living:  
Basic ADLs (From Assessment) Complex ADLs (From Assessment) Feeding: Maximum assistance Oral Facial Hygiene/Grooming: Maximum assistance Bathing: Total assistance Upper Body Dressing: Total assistance Lower Body Dressing: Total assistance Toileting: Total assistance Instrumental ADL Meal Preparation: Total assistance Homemaking: Total assistance Grooming/Bathing/Dressing Activities of Daily Living Grooming Washing Hands: Maximum assistance Cognitive Retraining Safety/Judgement: Fall prevention Bed/Mat Mobility Rolling: Maximum assistance Supine to Sit: Maximum assistance Sit to Supine: Maximum assistance Sit to Stand: Moderate assistance;Assist x2 Scooting: Moderate assistance Most Recent Physical Functioning:  
Gross Assessment: 
AROM: Generally decreased, functional 
Strength: Generally decreased, functional 
Coordination: Generally decreased, functional 
         
  
Posture: 
Posture (WDL): Exceptions to [facility] Posture Assessment: Forward head, Rounded shoulders Balance: 
Sitting: Impaired Sitting - Static: Prop sitting Sitting - Dynamic: Fair (occasional) Standing: Impaired Standing - Static: Constant support Standing - Dynamic : Poor Bed Mobility: 
Rolling: Maximum assistance Supine to Sit: Maximum assistance Sit to Supine: Maximum assistance Scooting: Moderate assistance Wheelchair Mobility: 
  
Transfers: 
Sit to Stand: Moderate assistance;Assist x2 Stand to Sit: Maximum assistance;Assist x2 Patient Vitals for the past 6 hrs: 
 BP BP Patient Position SpO2 Pulse  
09/20/18 0745 158/71 At rest 94 % (!) 51 Mental Status Neurologic State: Drowsy Orientation Level: Disoriented to place, Disoriented to situation, Oriented to person Cognition: Decreased attention/concentration, Decreased command following Perception: Appears intact Perseveration: No perseveration noted Safety/Judgement: Fall prevention Physical Skills Involved: 1. Range of Motion 2. Balance 3. Strength 4. Activity Tolerance 5. Pain (acute) Cognitive Skills Affected (resulting in the inability to perform in a timely and safe manner): 1. Executive Function 2. Short Term Recall 3. Sustained Attention 4. Divided Attention 5. Comprehension 6. Expression Psychosocial Skills Affected: 1. Habits/Routines 2. Environmental Adaptation 3. Social Interaction Number of elements that affect the Plan of Care: 5+:  HIGH COMPLEXITY CLINICAL DECISION MAKIN[address] [place] [address] AM-PAC 6 Clicks Daily Activity Inpatient Short Form How much help from another person does the patient currently need. .. Total A Lot A Little None 1. Putting on and taking off regular lower body clothing? [x] 1   [] 2   [] 3   [] 4  
2. Bathing (including washing, rinsing, drying)? [x] 1   [] 2   [] 3   [] 4  
3. Toileting, which includes using toilet, bedpan or urinal?   [x] 1   [] 2   [] 3   [] 4  
4. Putting on and taking off regular upper body clothing? [x] 1   [] 2   [] 3   [] 4  
5. Taking care of personal grooming such as brushing teeth? [] 1   [x] 2   [] 3   [] 4  
6. Eating meals? [] 1   [x] 2   [] 3   [] 4  
© , Trustees of [address] [place] [address], under license to [employer]. All rights reserved Score:  Initial: 9 Most Recent: X (Date: -- ) Interpretation of Tool:  Represents activities that are increasingly more difficult (i.e. Bed mobility, Transfers, Gait). Score 24 23 22-20 19-15 14-10 9-7 6 Modifier CH CI CJ CK CL CM CN   
 
? Self Care:  
  - CURRENT STATUS: CM - 80%-99% impaired, limited or restricted  - GOAL STATUS: CL - 60%-79% impaired, limited or restricted  - D/C STATUS:  ---------------To be determined--------------- [place] / [place] B / Product Type: Medicare /   
 Medical Necessity:    
· Patient demonstrates fair rehab potential due to higher previous functional level. Reason for Services/Other Comments: 
· Patient continues to require skilled intervention due to decreased independence with ADL/functional transfers. Use of outcome tool(s) and clinical judgement create a POC that gives a: HIGH COMPLEXITY  
 
 
 
TREATMENT:  
(In addition to Assessment/Re-Assessment sessions the following treatments were rendered) Pre-treatment Symptoms/Complaints:   
Pain: Initial:  
Pain Intensity 1:  (unable to rate pain; reports pain in mouth/chest;nurse noti.)  Post Session:  same Therapeutic Activity: (    10): Therapeutic activities including Bed transfers, Chair transfers and sit to stand x 1 to improve mobility, strength, balance and coordination. Required moderate to maximal assistance x 2   to promote static balance in standing.  
 
n/a Braces/Orthotics/Lines/Etc:  
· O2 Device: Room air Treatment/Session Assessment:   
· Response to Treatment:  Pt tolerated with drowsiness and decreased command following. · Interdisciplinary Collaboration:  
o Occupational Therapist 
o Registered Nurse 
o Nursing student; Arabic interpetor · After treatment position/precautions:  
o Supine in bed 
o Bed alarm/tab alert on 
o Bed/Chair-wheels locked 
o Bed in low position 
o Call light within reach 
o RN notified · Compliance with Program/Exercises: Will assess as treatment progresses. · Recommendations/Intent for next treatment session: \"Next visit will focus on advancements to more challenging activities and reduction in assistance provided\". Total Treatment Duration: OT Patient Time In/Time Out Time In: 0362 Time Out: 1005 Carin Grimes OT

## 2018-09-21 ENCOUNTER — PATIENT OUTREACH (OUTPATIENT)
Dept: CASE MANAGEMENT | Age: 83
End: 2018-09-21

## 2018-09-22 LAB
BACTERIA SPEC CULT: NORMAL
BACTERIA SPEC CULT: NORMAL
SERVICE CMNT-IMP: NORMAL
SERVICE CMNT-IMP: NORMAL

## 2018-09-22 NOTE — PROGRESS NOTES
Please note patient was IP d/c to [facility] and will be outreached within 21 days. Sussy Phillips LPN/ Care Coordinator [facility]:[phone] Fiona [address] [place] [address] 
[email or web address] note will not be viewable in [address]

## 2019-03-13 ENCOUNTER — HOSPITAL ENCOUNTER (OUTPATIENT)
Dept: LAB | Age: 84
Discharge: HOME | End: 2019-03-13

## 2019-03-13 LAB
ANION GAP SERPL CALC-SCNC: 6 MMOL/L (ref 7–16)
APPEARANCE UR: CLEAR
BILIRUB UR QL: NEGATIVE
BUN SERPL-MCNC: 25 MG/DL (ref 8–23)
CALCIUM SERPL-MCNC: 8.4 MG/DL (ref 8.3–10.4)
CHLORIDE SERPL-SCNC: 107 MMOL/L (ref 98–107)
CO2 SERPL-SCNC: 30 MMOL/L (ref 21–32)
COLOR UR: YELLOW
CREAT SERPL-MCNC: 1.53 MG/DL (ref 0.8–1.5)
ERYTHROCYTE [DISTWIDTH] IN BLOOD BY AUTOMATED COUNT: 12.8 % (ref 11.9–14.6)
GLUCOSE SERPL-MCNC: 81 MG/DL (ref 65–100)
GLUCOSE UR STRIP.AUTO-MCNC: NEGATIVE MG/DL
HCT VFR BLD AUTO: 35.7 % (ref 41.1–50.3)
HGB BLD-MCNC: 11.6 G/DL (ref 13.6–17.2)
HGB UR QL STRIP: NEGATIVE
KETONES UR QL STRIP.AUTO: NEGATIVE MG/DL
LEUKOCYTE ESTERASE UR QL STRIP.AUTO: NEGATIVE
MCH RBC QN AUTO: 33.3 PG (ref 26.1–32.9)
MCHC RBC AUTO-ENTMCNC: 32.5 G/DL (ref 31.4–35)
MCV RBC AUTO: 102.6 FL (ref 79.6–97.8)
NITRITE UR QL STRIP.AUTO: NEGATIVE
NRBC # BLD: 0 K/UL (ref 0–0.2)
PH UR STRIP: 5 [PH] (ref 5–9)
PLATELET # BLD AUTO: 146 K/UL (ref 150–450)
PMV BLD AUTO: 10.3 FL (ref 9.4–12.3)
POTASSIUM SERPL-SCNC: 4.2 MMOL/L (ref 3.5–5.1)
PROT UR STRIP-MCNC: NEGATIVE MG/DL
RBC # BLD AUTO: 3.48 M/UL (ref 4.23–5.6)
SODIUM SERPL-SCNC: 143 MMOL/L (ref 136–145)
SP GR UR REFRACTOMETRY: 1.02 (ref 1–1.02)
UROBILINOGEN UR QL STRIP.AUTO: 0.2 EU/DL (ref 0.2–1)
WBC # BLD AUTO: 5 K/UL (ref 4.3–11.1)

## 2019-03-13 PROCEDURE — 80048 BASIC METABOLIC PNL TOTAL CA: CPT

## 2019-03-13 PROCEDURE — 81003 URINALYSIS AUTO W/O SCOPE: CPT

## 2019-03-13 PROCEDURE — 85027 COMPLETE CBC AUTOMATED: CPT

## 2019-03-29 ENCOUNTER — HOSPITAL ENCOUNTER (INPATIENT)
Age: 84
LOS: 4 days | Discharge: NURSING, REHAB OR LONG-TERM CARE | DRG: 871 | End: 2019-04-05
Attending: EMERGENCY MEDICINE | Admitting: INTERNAL MEDICINE
Payer: MEDICARE

## 2019-03-29 ENCOUNTER — APPOINTMENT (OUTPATIENT)
Dept: GENERAL RADIOLOGY | Age: 84
DRG: 871 | End: 2019-03-29
Attending: EMERGENCY MEDICINE
Payer: MEDICARE

## 2019-03-29 DIAGNOSIS — R09.02 HYPOXEMIA: ICD-10-CM

## 2019-03-29 DIAGNOSIS — R50.9 FEVER, UNSPECIFIED FEVER CAUSE: Primary | ICD-10-CM

## 2019-03-29 LAB
ALBUMIN SERPL-MCNC: 3.6 G/DL (ref 3.2–4.6)
ALBUMIN/GLOB SERPL: 0.9 {RATIO} (ref 1.2–3.5)
ALP SERPL-CCNC: 69 U/L (ref 50–136)
ALT SERPL-CCNC: 13 U/L (ref 12–65)
AMMONIA PLAS-SCNC: 21 UMOL/L (ref 11–32)
ANION GAP SERPL CALC-SCNC: 9 MMOL/L (ref 7–16)
APPEARANCE UR: CLEAR
AST SERPL-CCNC: 12 U/L (ref 15–37)
BACTERIA URNS QL MICRO: 0 /HPF
BASOPHILS # BLD: 0 K/UL (ref 0–0.2)
BASOPHILS NFR BLD: 0 % (ref 0–2)
BILIRUB SERPL-MCNC: 0.5 MG/DL (ref 0.2–1.1)
BILIRUB UR QL: NEGATIVE
BNP SERPL-MCNC: 281 PG/ML
BUN SERPL-MCNC: 20 MG/DL (ref 8–23)
CALCIUM SERPL-MCNC: 8.7 MG/DL (ref 8.3–10.4)
CASTS URNS QL MICRO: 0 /LPF
CHLORIDE SERPL-SCNC: 104 MMOL/L (ref 98–107)
CO2 SERPL-SCNC: 26 MMOL/L (ref 21–32)
COLOR UR: YELLOW
CREAT SERPL-MCNC: 1.27 MG/DL (ref 0.8–1.5)
DIFFERENTIAL METHOD BLD: ABNORMAL
EOSINOPHIL # BLD: 0.2 K/UL (ref 0–0.8)
EOSINOPHIL NFR BLD: 2 % (ref 0.5–7.8)
EPI CELLS #/AREA URNS HPF: ABNORMAL /HPF
ERYTHROCYTE [DISTWIDTH] IN BLOOD BY AUTOMATED COUNT: 12.7 % (ref 11.9–14.6)
FLUAV AG NPH QL IA: NEGATIVE
FLUBV AG NPH QL IA: NEGATIVE
GLOBULIN SER CALC-MCNC: 4.2 G/DL (ref 2.3–3.5)
GLUCOSE SERPL-MCNC: 122 MG/DL (ref 65–100)
GLUCOSE UR STRIP.AUTO-MCNC: NEGATIVE MG/DL
HCT VFR BLD AUTO: 39.7 % (ref 41.1–50.3)
HGB BLD-MCNC: 13.1 G/DL (ref 13.6–17.2)
HGB UR QL STRIP: NEGATIVE
IMM GRANULOCYTES # BLD AUTO: 0 K/UL (ref 0–0.5)
IMM GRANULOCYTES NFR BLD AUTO: 0 % (ref 0–5)
KETONES UR QL STRIP.AUTO: NEGATIVE MG/DL
LACTATE BLD-SCNC: 1.67 MMOL/L (ref 0.5–1.9)
LEUKOCYTE ESTERASE UR QL STRIP.AUTO: NEGATIVE
LYMPHOCYTES # BLD: 1 K/UL (ref 0.5–4.6)
LYMPHOCYTES NFR BLD: 9 % (ref 13–44)
MCH RBC QN AUTO: 32.8 PG (ref 26.1–32.9)
MCHC RBC AUTO-ENTMCNC: 33 G/DL (ref 31.4–35)
MCV RBC AUTO: 99.5 FL (ref 79.6–97.8)
MONOCYTES # BLD: 0.9 K/UL (ref 0.1–1.3)
MONOCYTES NFR BLD: 8 % (ref 4–12)
NEUTS SEG # BLD: 8.7 K/UL (ref 1.7–8.2)
NEUTS SEG NFR BLD: 80 % (ref 43–78)
NITRITE UR QL STRIP.AUTO: NEGATIVE
NRBC # BLD: 0 K/UL (ref 0–0.2)
PH UR STRIP: 5 [PH] (ref 5–9)
PLATELET # BLD AUTO: 197 K/UL (ref 150–450)
PMV BLD AUTO: 9.4 FL (ref 9.4–12.3)
POTASSIUM SERPL-SCNC: 3.7 MMOL/L (ref 3.5–5.1)
PROCALCITONIN SERPL-MCNC: <0.1 NG/ML
PROT SERPL-MCNC: 7.8 G/DL (ref 6.3–8.2)
PROT UR STRIP-MCNC: 30 MG/DL
RBC # BLD AUTO: 3.99 M/UL (ref 4.23–5.6)
RBC #/AREA URNS HPF: 0 /HPF
SODIUM SERPL-SCNC: 139 MMOL/L (ref 136–145)
SP GR UR REFRACTOMETRY: 1.02 (ref 1–1.02)
SPECIMEN SOURCE: NORMAL
TROPONIN I SERPL-MCNC: 0.03 NG/ML (ref 0.02–0.05)
UROBILINOGEN UR QL STRIP.AUTO: 0.2 EU/DL (ref 0.2–1)
WBC # BLD AUTO: 10.8 K/UL (ref 4.3–11.1)
WBC URNS QL MICRO: ABNORMAL /HPF

## 2019-03-29 PROCEDURE — 80053 COMPREHEN METABOLIC PANEL: CPT

## 2019-03-29 PROCEDURE — 84145 PROCALCITONIN (PCT): CPT

## 2019-03-29 PROCEDURE — 83880 ASSAY OF NATRIURETIC PEPTIDE: CPT

## 2019-03-29 PROCEDURE — 87804 INFLUENZA ASSAY W/OPTIC: CPT

## 2019-03-29 PROCEDURE — 93005 ELECTROCARDIOGRAM TRACING: CPT | Performed by: EMERGENCY MEDICINE

## 2019-03-29 PROCEDURE — 71045 X-RAY EXAM CHEST 1 VIEW: CPT

## 2019-03-29 PROCEDURE — 96365 THER/PROPH/DIAG IV INF INIT: CPT | Performed by: EMERGENCY MEDICINE

## 2019-03-29 PROCEDURE — 99218 HC RM OBSERVATION: CPT

## 2019-03-29 PROCEDURE — 96367 TX/PROPH/DG ADDL SEQ IV INF: CPT | Performed by: EMERGENCY MEDICINE

## 2019-03-29 PROCEDURE — 99285 EMERGENCY DEPT VISIT HI MDM: CPT | Performed by: EMERGENCY MEDICINE

## 2019-03-29 PROCEDURE — 74011000258 HC RX REV CODE- 258: Performed by: EMERGENCY MEDICINE

## 2019-03-29 PROCEDURE — 82140 ASSAY OF AMMONIA: CPT

## 2019-03-29 PROCEDURE — 84484 ASSAY OF TROPONIN QUANT: CPT

## 2019-03-29 PROCEDURE — 74011250636 HC RX REV CODE- 250/636: Performed by: EMERGENCY MEDICINE

## 2019-03-29 PROCEDURE — 83605 ASSAY OF LACTIC ACID: CPT

## 2019-03-29 PROCEDURE — 81001 URINALYSIS AUTO W/SCOPE: CPT

## 2019-03-29 PROCEDURE — 87040 BLOOD CULTURE FOR BACTERIA: CPT

## 2019-03-29 PROCEDURE — 74011250637 HC RX REV CODE- 250/637: Performed by: EMERGENCY MEDICINE

## 2019-03-29 PROCEDURE — 85025 COMPLETE CBC W/AUTO DIFF WBC: CPT

## 2019-03-29 RX ORDER — FUROSEMIDE 20 MG/1
20 TABLET ORAL DAILY
Status: DISCONTINUED | OUTPATIENT
Start: 2019-03-30 | End: 2019-04-05 | Stop reason: HOSPADM

## 2019-03-29 RX ORDER — MEMANTINE HYDROCHLORIDE 5 MG/1
5 TABLET ORAL EVERY 12 HOURS
Status: DISCONTINUED | OUTPATIENT
Start: 2019-03-30 | End: 2019-04-05 | Stop reason: HOSPADM

## 2019-03-29 RX ORDER — ENOXAPARIN SODIUM 100 MG/ML
40 INJECTION SUBCUTANEOUS EVERY 24 HOURS
Status: DISCONTINUED | OUTPATIENT
Start: 2019-03-29 | End: 2019-04-05 | Stop reason: HOSPADM

## 2019-03-29 RX ORDER — SODIUM CHLORIDE 0.9 % (FLUSH) 0.9 %
5-40 SYRINGE (ML) INJECTION EVERY 8 HOURS
Status: DISCONTINUED | OUTPATIENT
Start: 2019-03-29 | End: 2019-04-05 | Stop reason: HOSPADM

## 2019-03-29 RX ORDER — TRAMADOL HYDROCHLORIDE 50 MG/1
50 TABLET ORAL
Status: DISCONTINUED | OUTPATIENT
Start: 2019-03-29 | End: 2019-04-05 | Stop reason: HOSPADM

## 2019-03-29 RX ORDER — SERTRALINE HYDROCHLORIDE 50 MG/1
50 TABLET, FILM COATED ORAL DAILY
Status: DISCONTINUED | OUTPATIENT
Start: 2019-03-30 | End: 2019-03-30

## 2019-03-29 RX ORDER — LEVOTHYROXINE SODIUM 88 UG/1
88 TABLET ORAL
Status: DISCONTINUED | OUTPATIENT
Start: 2019-03-30 | End: 2019-03-30

## 2019-03-29 RX ORDER — QUETIAPINE FUMARATE 25 MG/1
25 TABLET, FILM COATED ORAL
Status: DISCONTINUED | OUTPATIENT
Start: 2019-03-29 | End: 2019-03-30

## 2019-03-29 RX ORDER — DULOXETIN HYDROCHLORIDE 60 MG/1
60 CAPSULE, DELAYED RELEASE ORAL 2 TIMES DAILY
Status: DISCONTINUED | OUTPATIENT
Start: 2019-03-30 | End: 2019-03-30

## 2019-03-29 RX ORDER — METOPROLOL SUCCINATE 25 MG/1
12.5 TABLET, EXTENDED RELEASE ORAL DAILY
Status: DISCONTINUED | OUTPATIENT
Start: 2019-03-30 | End: 2019-04-05 | Stop reason: HOSPADM

## 2019-03-29 RX ORDER — ALPRAZOLAM 0.5 MG/1
0.25 TABLET ORAL
Status: DISCONTINUED | OUTPATIENT
Start: 2019-03-29 | End: 2019-03-30

## 2019-03-29 RX ORDER — ACETAMINOPHEN 325 MG/1
650 TABLET ORAL
Status: DISCONTINUED | OUTPATIENT
Start: 2019-03-29 | End: 2019-04-05 | Stop reason: HOSPADM

## 2019-03-29 RX ORDER — LOSARTAN POTASSIUM 50 MG/1
100 TABLET ORAL DAILY
Status: DISCONTINUED | OUTPATIENT
Start: 2019-03-30 | End: 2019-04-05 | Stop reason: HOSPADM

## 2019-03-29 RX ORDER — LANOLIN ALCOHOL/MO/W.PET/CERES
1 CREAM (GRAM) TOPICAL
Status: DISCONTINUED | OUTPATIENT
Start: 2019-03-30 | End: 2019-04-05 | Stop reason: HOSPADM

## 2019-03-29 RX ORDER — DOCUSATE SODIUM 100 MG/1
100 CAPSULE, LIQUID FILLED ORAL 2 TIMES DAILY
Status: DISCONTINUED | OUTPATIENT
Start: 2019-03-30 | End: 2019-04-05 | Stop reason: HOSPADM

## 2019-03-29 RX ORDER — OXYBUTYNIN CHLORIDE 5 MG/1
5 TABLET, EXTENDED RELEASE ORAL
Status: DISCONTINUED | OUTPATIENT
Start: 2019-03-29 | End: 2019-04-05 | Stop reason: HOSPADM

## 2019-03-29 RX ORDER — VANCOMYCIN 1.75 GRAM/500 ML IN 0.9 % SODIUM CHLORIDE INTRAVENOUS
1750 ONCE
Status: COMPLETED | OUTPATIENT
Start: 2019-03-29 | End: 2019-03-29

## 2019-03-29 RX ORDER — ALENDRONATE SODIUM 10 MG/1
70 TABLET ORAL
Status: DISCONTINUED | OUTPATIENT
Start: 2019-03-31 | End: 2019-03-29

## 2019-03-29 RX ORDER — ACETAMINOPHEN 650 MG/1
650 SUPPOSITORY RECTAL
Status: COMPLETED | OUTPATIENT
Start: 2019-03-29 | End: 2019-03-29

## 2019-03-29 RX ORDER — ONDANSETRON 2 MG/ML
4 INJECTION INTRAMUSCULAR; INTRAVENOUS
Status: DISCONTINUED | OUTPATIENT
Start: 2019-03-29 | End: 2019-04-05 | Stop reason: HOSPADM

## 2019-03-29 RX ORDER — DONEPEZIL HYDROCHLORIDE 5 MG/1
10 TABLET, FILM COATED ORAL
Status: DISCONTINUED | OUTPATIENT
Start: 2019-03-29 | End: 2019-03-30

## 2019-03-29 RX ORDER — AMLODIPINE BESYLATE 5 MG/1
5 TABLET ORAL DAILY
Status: DISCONTINUED | OUTPATIENT
Start: 2019-03-30 | End: 2019-04-05 | Stop reason: HOSPADM

## 2019-03-29 RX ORDER — ASPIRIN 81 MG/1
81 TABLET ORAL DAILY
Status: DISCONTINUED | OUTPATIENT
Start: 2019-03-30 | End: 2019-04-05 | Stop reason: HOSPADM

## 2019-03-29 RX ORDER — PRAVASTATIN SODIUM 20 MG/1
20 TABLET ORAL
Status: DISCONTINUED | OUTPATIENT
Start: 2019-03-29 | End: 2019-04-05 | Stop reason: HOSPADM

## 2019-03-29 RX ORDER — FERROUS SULFATE, DRIED 160(50) MG
1 TABLET, EXTENDED RELEASE ORAL 2 TIMES DAILY WITH MEALS
Status: DISCONTINUED | OUTPATIENT
Start: 2019-03-30 | End: 2019-04-05 | Stop reason: HOSPADM

## 2019-03-29 RX ORDER — TAMSULOSIN HYDROCHLORIDE 0.4 MG/1
0.4 CAPSULE ORAL DAILY
Status: DISCONTINUED | OUTPATIENT
Start: 2019-03-30 | End: 2019-04-05 | Stop reason: HOSPADM

## 2019-03-29 RX ORDER — SODIUM CHLORIDE 0.9 % (FLUSH) 0.9 %
5-40 SYRINGE (ML) INJECTION AS NEEDED
Status: DISCONTINUED | OUTPATIENT
Start: 2019-03-29 | End: 2019-04-05 | Stop reason: HOSPADM

## 2019-03-29 RX ORDER — ALBUTEROL SULFATE 0.83 MG/ML
2.5 SOLUTION RESPIRATORY (INHALATION)
Status: DISCONTINUED | OUTPATIENT
Start: 2019-03-29 | End: 2019-04-05 | Stop reason: HOSPADM

## 2019-03-29 RX ORDER — LANOLIN ALCOHOL/MO/W.PET/CERES
1000 CREAM (GRAM) TOPICAL DAILY
Status: DISCONTINUED | OUTPATIENT
Start: 2019-03-30 | End: 2019-04-05 | Stop reason: HOSPADM

## 2019-03-29 RX ORDER — ERGOCALCIFEROL 1.25 MG/1
50000 CAPSULE ORAL
Status: DISCONTINUED | OUTPATIENT
Start: 2019-04-01 | End: 2019-04-05 | Stop reason: HOSPADM

## 2019-03-29 RX ADMIN — PIPERACILLIN SODIUM,TAZOBACTAM SODIUM 4.5 G: 4; .5 INJECTION, POWDER, FOR SOLUTION INTRAVENOUS at 20:14

## 2019-03-29 RX ADMIN — VANCOMYCIN HYDROCHLORIDE 1750 MG: 10 INJECTION, POWDER, LYOPHILIZED, FOR SOLUTION INTRAVENOUS at 20:31

## 2019-03-29 RX ADMIN — ACETAMINOPHEN 650 MG: 650 SUPPOSITORY RECTAL at 19:47

## 2019-03-29 NOTE — ED PROVIDER NOTES
The history is provided by the patient, a relative and the EMS personnel. The history is limited by the condition of the patient and a language barrier. A  was used. Flu This is a new problem. The current episode started 12 to 24 hours ago. The problem occurs constantly. The problem has been rapidly worsening. The cough is productive of sputum. Patient reports a subjective fever - was not measured. The fever has been present for less than 1 day. Associated symptoms include rhinorrhea, shortness of breath and wheezing. Pertinent negatives include no nausea and no vomiting. He has tried nothing for the symptoms. The treatment provided no relief. Risk factors: nursing home resident. He is not a smoker. His past medical history is significant for CHF. His past medical history does not include bronchitis, pneumonia, bronchiectasis, COPD, emphysema, asthma, cancer or heart failure. Past Medical History:  
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  
Procedure Laterality Date  HX CHOLECYSTECTOMY  HX HEMORRHOIDECTOMY  HX PROSTATECTOMY History reviewed. No pertinent family history. Social History Socioeconomic History  Marital status:  Spouse name: Not on file  Number of children: Not on file  Years of education: Not on file  Highest education level: Not on file Occupational History  Not on file Social Needs  Financial resource strain: Not on file  Food insecurity:  
  Worry: Not on file Inability: Not on file  Transportation needs:  
  Medical: Not on file Non-medical: Not on file Tobacco Use  Smoking status: Unknown If Ever Smoked  Tobacco comment: FORMER SMOKER 8 CIGS/DAY QUIT 25 YRS AGO Substance and Sexual Activity  Alcohol use: No  
  Comment: FRANK RILEY  Drug use: Not on file  Sexual activity: Not on file Lifestyle  Physical activity:  
  Days per week: Not on file Minutes per session: Not on file  Stress: Not on file Relationships  Social connections:  
  Talks on phone: Not on file Gets together: Not on file Attends [employer] service: Not on file Active member of club or organization: Not on file Attends meetings of clubs or organizations: Not on file Relationship status: Not on file  Intimate partner violence:  
  Fear of current or ex partner: Not on file Emotionally abused: Not on file Physically abused: Not on file Forced sexual activity: Not on file Other Topics Concern  Not on file Social History Narrative  Not on file ALLERGIES: Patient has no known allergies. Review of Systems Unable to perform ROS: Dementia HENT: Positive for rhinorrhea. Respiratory: Positive for shortness of breath and wheezing. Gastrointestinal: Positive for diarrhea (per the daughter, one loose stool yesterday). Negative for nausea and vomiting. Vitals:  
 03/29/19 1909 03/29/19 1913 BP: 193/84 Pulse: 90 90 Resp: 28 (!) 33 Temp: (!) 103.3 °F (39.6 °C) SpO2: (!) 88% 93% Weight: 72.6 kg (160 lb) Height: 5' 5\" (1.651 m) Physical Exam  
Constitutional: He appears well-developed and well-nourished. He appears distressed (mild).   
HENT:  
 Head: Normocephalic and atraumatic. Right Ear: External ear normal.  
Left Ear: External ear normal.  
Mouth/Throat: Oropharynx is clear and moist.  
Eyes: Pupils are equal, round, and reactive to light. Conjunctivae and EOM are normal.  
Neck: Normal range of motion. Neck supple. Cardiovascular: Normal rate, regular rhythm, normal heart sounds and intact distal pulses. Pulmonary/Chest: Effort normal and breath sounds normal.  
Abdominal: Soft. Bowel sounds are normal. There is no tenderness. Musculoskeletal: Normal range of motion. He exhibits no edema. Neurological: He is alert. He has normal strength. He is disoriented (only oriented to self). No cranial nerve deficit or sensory deficit. Skin: Skin is warm and dry. Capillary refill takes less than 2 seconds. Psychiatric: He has a normal mood and affect. His speech is normal. He is slowed. Thought content is not paranoid and not delusional. Cognition and memory are impaired. He expresses no homicidal and no suicidal ideation. Nursing note and vitals reviewed. MDM Number of Diagnoses or Management Options Fever, unspecified fever cause: new and requires workup Hypoxemia: new and requires workup Amount and/or Complexity of Data Reviewed Clinical lab tests: ordered Tests in the radiology section of CPT®: ordered and reviewed Obtain history from someone other than the patient: yes Review and summarize past medical records: yes Discuss the patient with other providers: yes Independent visualization of images, tracings, or specimens: yes Risk of Complications, Morbidity, and/or Mortality Presenting problems: high Diagnostic procedures: moderate Management options: high Patient Progress Patient progress: stable Procedures The patient was observed in the ED.   Due to presentation of hypoxemia along with URI symptoms and fever to 103, the patient was treated for sepsis with vancomycin, Zosyn. He was given Tylenol for his fever, but due to history of CHF and good blood pressure, fluid boluses were withheld. Case discussed with the hospitalist, over concern for his fever of unknown origin and hypoxemia and he'll be admitted for observation. Results Reviewed: 
 
 
Recent Results (from the past 24 hour(s)) POC LACTIC ACID Collection Time: 03/29/19  7:10 PM  
Result Value Ref Range Lactic Acid (POC) 1.67 0.5 - 1.9 mmol/L  
CBC WITH AUTOMATED DIFF Collection Time: 03/29/19  7:12 PM  
Result Value Ref Range WBC 10.8 4.3 - 11.1 K/uL  
 RBC 3.99 (L) 4.23 - 5.6 M/uL  
 HGB 13.1 (L) 13.6 - 17.2 g/dL HCT 39.7 (L) 41.1 - 50.3 % MCV 99.5 (H) 79.6 - 97.8 FL  
 MCH 32.8 26.1 - 32.9 PG  
 MCHC 33.0 31.4 - 35.0 g/dL  
 RDW 12.7 11.9 - 14.6 % PLATELET 950 499 - 064 K/uL MPV 9.4 9.4 - 12.3 FL ABSOLUTE NRBC 0.00 0.0 - 0.2 K/uL  
 DF AUTOMATED NEUTROPHILS 80 (H) 43 - 78 % LYMPHOCYTES 9 (L) 13 - 44 % MONOCYTES 8 4.0 - 12.0 % EOSINOPHILS 2 0.5 - 7.8 % BASOPHILS 0 0.0 - 2.0 % IMMATURE GRANULOCYTES 0 0.0 - 5.0 %  
 ABS. NEUTROPHILS 8.7 (H) 1.7 - 8.2 K/UL  
 ABS. LYMPHOCYTES 1.0 0.5 - 4.6 K/UL  
 ABS. MONOCYTES 0.9 0.1 - 1.3 K/UL  
 ABS. EOSINOPHILS 0.2 0.0 - 0.8 K/UL  
 ABS. BASOPHILS 0.0 0.0 - 0.2 K/UL  
 ABS. IMM. GRANS. 0.0 0.0 - 0.5 K/UL METABOLIC PANEL, COMPREHENSIVE Collection Time: 03/29/19  7:12 PM  
Result Value Ref Range Sodium 139 136 - 145 mmol/L Potassium 3.7 3.5 - 5.1 mmol/L Chloride 104 98 - 107 mmol/L  
 CO2 26 21 - 32 mmol/L Anion gap 9 7 - 16 mmol/L Glucose 122 (H) 65 - 100 mg/dL BUN 20 8 - 23 MG/DL Creatinine 1.27 0.8 - 1.5 MG/DL  
 GFR est AA >60 >60 ml/min/1.73m2 GFR est non-AA 57 (L) >60 ml/min/1.73m2 Calcium 8.7 8.3 - 10.4 MG/DL Bilirubin, total 0.5 0.2 - 1.1 MG/DL  
 ALT (SGPT) 13 12 - 65 U/L  
 AST (SGOT) 12 (L) 15 - 37 U/L Alk.  phosphatase 69 50 - 136 U/L  
 Protein, total 7.8 6.3 - 8.2 g/dL Albumin 3.6 3.2 - 4.6 g/dL Globulin 4.2 (H) 2.3 - 3.5 g/dL A-G Ratio 0.9 (L) 1.2 - 3.5 PROCALCITONIN Collection Time: 03/29/19  7:12 PM  
Result Value Ref Range Procalcitonin <0.1 ng/mL TROPONIN I Collection Time: 03/29/19  7:12 PM  
Result Value Ref Range Troponin-I, Qt. 0.03 0.02 - 0.05 NG/ML  
BNP Collection Time: 03/29/19  7:12 PM  
Result Value Ref Range  (H) 0 pg/mL URINALYSIS W/ RFLX MICROSCOPIC Collection Time: 03/29/19  8:07 PM  
Result Value Ref Range Color YELLOW Appearance CLEAR Specific gravity 1.020 1.001 - 1.023    
 pH (UA) 5.0 5.0 - 9.0 Protein 30 (A) NEG mg/dL Glucose NEGATIVE  mg/dL Ketone NEGATIVE  NEG mg/dL Bilirubin NEGATIVE  NEG Blood NEGATIVE  NEG Urobilinogen 0.2 0.2 - 1.0 EU/dL Nitrites NEGATIVE  NEG Leukocyte Esterase NEGATIVE  NEG    
 WBC 0-3 0 /hpf  
 RBC 0 0 /hpf Epithelial cells 0-3 0 /hpf Bacteria 0 0 /hpf Casts 0 0 /lpf INFLUENZA A & B AG (RAPID TEST) Collection Time: 03/29/19  8:07 PM  
Result Value Ref Range Influenza A Ag NEGATIVE  NEG Influenza B Ag NEGATIVE  NEG Source NASOPHARYNGEAL    
  
 
XR CHEST PORT Final Result Impression: Borderline enlarged cardiac silhouette with grossly clear lungs.

## 2019-03-29 NOTE — ED TRIAGE NOTES
Pt arrives via EMS from [facility]. Called out for breathing issues. Minimal contact with nurse by EMS per EMS. Per EMS, DNR was not printed for transfer to this facility. Lung sound clear on auscultation by EMS. Initial Sp02= 88% on RA, elevated BP, and febrile 101.4 axillary. Pt appears to be non-verbal other than grunting. Citizen of [place] attempted, but pt continues to a be unresponsive in speech. Pt has contractures. Pt baseline for mentation unknown.

## 2019-03-29 NOTE — PROGRESS NOTES
available on-site from 4:30 p.m. - 1:00 a.m. Please call [phone] with any interpreting requests. Thank you, SHAD Pak / 
[facility] Patient Relations [facility] Interpreting Services 
c: [phone] / [email or web address] [email or web address] / [place], [address] [facility] 
[email or web address]

## 2019-03-30 LAB
ANION GAP SERPL CALC-SCNC: 7 MMOL/L (ref 7–16)
ATRIAL RATE: 227 BPM
BASOPHILS # BLD: 0 K/UL (ref 0–0.2)
BASOPHILS NFR BLD: 0 % (ref 0–2)
BUN SERPL-MCNC: 22 MG/DL (ref 8–23)
CALCIUM SERPL-MCNC: 8 MG/DL (ref 8.3–10.4)
CALCULATED R AXIS, ECG10: 53 DEGREES
CALCULATED T AXIS, ECG11: 101 DEGREES
CHLORIDE SERPL-SCNC: 108 MMOL/L (ref 98–107)
CO2 SERPL-SCNC: 26 MMOL/L (ref 21–32)
CREAT SERPL-MCNC: 1.28 MG/DL (ref 0.8–1.5)
DIAGNOSIS, 93000: NORMAL
DIFFERENTIAL METHOD BLD: ABNORMAL
EOSINOPHIL # BLD: 0.5 K/UL (ref 0–0.8)
EOSINOPHIL NFR BLD: 6 % (ref 0.5–7.8)
ERYTHROCYTE [DISTWIDTH] IN BLOOD BY AUTOMATED COUNT: 12.8 % (ref 11.9–14.6)
GLUCOSE SERPL-MCNC: 88 MG/DL (ref 65–100)
HCT VFR BLD AUTO: 34.1 % (ref 41.1–50.3)
HGB BLD-MCNC: 11.1 G/DL (ref 13.6–17.2)
IMM GRANULOCYTES # BLD AUTO: 0 K/UL (ref 0–0.5)
IMM GRANULOCYTES NFR BLD AUTO: 0 % (ref 0–5)
LYMPHOCYTES # BLD: 1.5 K/UL (ref 0.5–4.6)
LYMPHOCYTES NFR BLD: 17 % (ref 13–44)
MCH RBC QN AUTO: 33.3 PG (ref 26.1–32.9)
MCHC RBC AUTO-ENTMCNC: 32.6 G/DL (ref 31.4–35)
MCV RBC AUTO: 102.4 FL (ref 79.6–97.8)
MONOCYTES # BLD: 0.9 K/UL (ref 0.1–1.3)
MONOCYTES NFR BLD: 10 % (ref 4–12)
NEUTS SEG # BLD: 6.1 K/UL (ref 1.7–8.2)
NEUTS SEG NFR BLD: 67 % (ref 43–78)
NRBC # BLD: 0 K/UL (ref 0–0.2)
PLATELET # BLD AUTO: 165 K/UL (ref 150–450)
PMV BLD AUTO: 10.1 FL (ref 9.4–12.3)
POTASSIUM SERPL-SCNC: 3.7 MMOL/L (ref 3.5–5.1)
Q-T INTERVAL, ECG07: 332 MS
QRS DURATION, ECG06: 70 MS
QTC CALCULATION (BEZET), ECG08: 403 MS
RBC # BLD AUTO: 3.33 M/UL (ref 4.23–5.6)
SODIUM SERPL-SCNC: 141 MMOL/L (ref 136–145)
TSH SERPL DL<=0.005 MIU/L-ACNC: 3.31 UIU/ML (ref 0.36–3.74)
VENTRICULAR RATE, ECG03: 89 BPM
WBC # BLD AUTO: 9.1 K/UL (ref 4.3–11.1)

## 2019-03-30 PROCEDURE — 99218 HC RM OBSERVATION: CPT

## 2019-03-30 PROCEDURE — 77030020263 HC SOL INJ SOD CL0.9% LFCR 1000ML

## 2019-03-30 PROCEDURE — 36415 COLL VENOUS BLD VENIPUNCTURE: CPT

## 2019-03-30 PROCEDURE — 94760 N-INVAS EAR/PLS OXIMETRY 1: CPT

## 2019-03-30 PROCEDURE — 74011250636 HC RX REV CODE- 250/636: Performed by: FAMILY MEDICINE

## 2019-03-30 PROCEDURE — 80048 BASIC METABOLIC PNL TOTAL CA: CPT

## 2019-03-30 PROCEDURE — 74011250637 HC RX REV CODE- 250/637: Performed by: FAMILY MEDICINE

## 2019-03-30 PROCEDURE — 77010033678 HC OXYGEN DAILY

## 2019-03-30 PROCEDURE — 84443 ASSAY THYROID STIM HORMONE: CPT

## 2019-03-30 PROCEDURE — 85025 COMPLETE CBC W/AUTO DIFF WBC: CPT

## 2019-03-30 PROCEDURE — 74011000258 HC RX REV CODE- 258: Performed by: FAMILY MEDICINE

## 2019-03-30 RX ORDER — RIVASTIGMINE 13.3 MG/24H
1 PATCH, EXTENDED RELEASE TRANSDERMAL DAILY
COMMUNITY

## 2019-03-30 RX ORDER — CETIRIZINE HCL 10 MG
10 TABLET ORAL DAILY
COMMUNITY

## 2019-03-30 RX ORDER — LEVOTHYROXINE SODIUM 75 UG/1
75 TABLET ORAL
Status: DISCONTINUED | OUTPATIENT
Start: 2019-03-30 | End: 2019-04-05 | Stop reason: HOSPADM

## 2019-03-30 RX ORDER — MELATONIN
1000 EVERY 12 HOURS
COMMUNITY

## 2019-03-30 RX ORDER — GUAIFENESIN 100 MG/5ML
200 SOLUTION ORAL
COMMUNITY

## 2019-03-30 RX ORDER — POLYETHYLENE GLYCOL 3350 17 G/17G
17 POWDER, FOR SOLUTION ORAL
COMMUNITY

## 2019-03-30 RX ORDER — ESCITALOPRAM OXALATE 10 MG/1
10 TABLET ORAL DAILY
COMMUNITY

## 2019-03-30 RX ORDER — ONDANSETRON 4 MG/1
4 TABLET, FILM COATED ORAL
COMMUNITY

## 2019-03-30 RX ORDER — ESCITALOPRAM OXALATE 10 MG/1
10 TABLET ORAL DAILY
Status: DISCONTINUED | OUTPATIENT
Start: 2019-03-30 | End: 2019-04-05 | Stop reason: HOSPADM

## 2019-03-30 RX ORDER — ARIPIPRAZOLE 1 MG/ML
12 SOLUTION ORAL 2 TIMES DAILY
COMMUNITY

## 2019-03-30 RX ORDER — VALPROIC ACID 250 MG/5ML
250 SOLUTION ORAL EVERY 12 HOURS
Status: DISCONTINUED | OUTPATIENT
Start: 2019-03-30 | End: 2019-04-05 | Stop reason: HOSPADM

## 2019-03-30 RX ORDER — VALPROIC ACID 250 MG/5ML
250 SOLUTION ORAL 2 TIMES DAILY
COMMUNITY

## 2019-03-30 RX ORDER — ACETAMINOPHEN 325 MG/1
650 TABLET ORAL
COMMUNITY

## 2019-03-30 RX ORDER — MEMANTINE HYDROCHLORIDE 5 MG/1
5 TABLET ORAL 2 TIMES DAILY
Status: DISCONTINUED | OUTPATIENT
Start: 2019-03-30 | End: 2019-03-30

## 2019-03-30 RX ORDER — MEMANTINE HYDROCHLORIDE 5 MG/1
5 TABLET ORAL 2 TIMES DAILY
COMMUNITY

## 2019-03-30 RX ORDER — MELOXICAM 7.5 MG/1
7.5 TABLET ORAL DAILY
COMMUNITY

## 2019-03-30 RX ORDER — LEVOTHYROXINE SODIUM 75 UG/1
75 TABLET ORAL
COMMUNITY

## 2019-03-30 RX ORDER — RIVASTIGMINE 13.3 MG/24H
1 PATCH, EXTENDED RELEASE TRANSDERMAL DAILY
Status: DISCONTINUED | OUTPATIENT
Start: 2019-03-30 | End: 2019-04-05 | Stop reason: HOSPADM

## 2019-03-30 RX ADMIN — PIPERACILLIN AND TAZOBACTAM 3.38 G: 3; .375 INJECTION, POWDER, FOR SOLUTION INTRAVENOUS at 02:15

## 2019-03-30 RX ADMIN — ARIPIPRAZOLE 12 MG: 10 TABLET ORAL at 21:39

## 2019-03-30 RX ADMIN — CALCIUM CARBONATE 500 MG (1,250 MG)-VITAMIN D3 200 UNIT TABLET 1 TABLET: at 08:48

## 2019-03-30 RX ADMIN — DOCUSATE SODIUM 100 MG: 100 CAPSULE, LIQUID FILLED ORAL at 17:21

## 2019-03-30 RX ADMIN — MEMANTINE HYDROCHLORIDE 5 MG: 5 TABLET ORAL at 21:40

## 2019-03-30 RX ADMIN — Medication 1 TABLET: at 08:45

## 2019-03-30 RX ADMIN — CALCIUM CARBONATE 500 MG (1,250 MG)-VITAMIN D3 200 UNIT TABLET 1 TABLET: at 17:21

## 2019-03-30 RX ADMIN — ENOXAPARIN SODIUM 40 MG: 40 INJECTION SUBCUTANEOUS at 22:51

## 2019-03-30 RX ADMIN — TAMSULOSIN HYDROCHLORIDE 0.4 MG: 0.4 CAPSULE ORAL at 08:49

## 2019-03-30 RX ADMIN — VALPROIC ACID 250 MG: 250 SOLUTION ORAL at 21:39

## 2019-03-30 RX ADMIN — OXYBUTYNIN CHLORIDE 5 MG: 5 TABLET, EXTENDED RELEASE ORAL at 01:28

## 2019-03-30 RX ADMIN — CYANOCOBALAMIN TAB 1000 MCG 1000 MCG: 1000 TAB at 08:48

## 2019-03-30 RX ADMIN — Medication 10 ML: at 05:08

## 2019-03-30 RX ADMIN — LEVOTHYROXINE SODIUM 75 MCG: 75 TABLET ORAL at 05:08

## 2019-03-30 RX ADMIN — Medication 10 ML: at 21:41

## 2019-03-30 RX ADMIN — PRAVASTATIN SODIUM 20 MG: 20 TABLET ORAL at 21:43

## 2019-03-30 RX ADMIN — ESCITALOPRAM OXALATE 10 MG: 10 TABLET ORAL at 08:47

## 2019-03-30 RX ADMIN — Medication 10 ML: at 01:29

## 2019-03-30 RX ADMIN — AMLODIPINE BESYLATE 5 MG: 5 TABLET ORAL at 08:47

## 2019-03-30 RX ADMIN — FERROUS SULFATE TAB 325 MG (65 MG ELEMENTAL FE) 325 MG: 325 (65 FE) TAB at 08:48

## 2019-03-30 RX ADMIN — Medication 10 ML: at 14:05

## 2019-03-30 RX ADMIN — ARIPIPRAZOLE 12 MG: 10 TABLET ORAL at 08:47

## 2019-03-30 RX ADMIN — ENOXAPARIN SODIUM 40 MG: 40 INJECTION SUBCUTANEOUS at 01:28

## 2019-03-30 RX ADMIN — PIPERACILLIN AND TAZOBACTAM 3.38 G: 3; .375 INJECTION, POWDER, FOR SOLUTION INTRAVENOUS at 09:18

## 2019-03-30 RX ADMIN — OXYBUTYNIN CHLORIDE 5 MG: 5 TABLET, EXTENDED RELEASE ORAL at 21:43

## 2019-03-30 RX ADMIN — PIPERACILLIN AND TAZOBACTAM 3.38 G: 3; .375 INJECTION, POWDER, FOR SOLUTION INTRAVENOUS at 17:19

## 2019-03-30 RX ADMIN — VALPROIC ACID 250 MG: 250 SOLUTION ORAL at 08:45

## 2019-03-30 RX ADMIN — PRAVASTATIN SODIUM 20 MG: 20 TABLET ORAL at 01:28

## 2019-03-30 RX ADMIN — DOCUSATE SODIUM 100 MG: 100 CAPSULE, LIQUID FILLED ORAL at 08:45

## 2019-03-30 RX ADMIN — ASPIRIN 81 MG: 81 TABLET, COATED ORAL at 08:48

## 2019-03-30 RX ADMIN — MEMANTINE HYDROCHLORIDE 5 MG: 5 TABLET ORAL at 08:47

## 2019-03-30 NOTE — PROGRESS NOTES
Problem: Falls - Risk of 
Goal: *Absence of Falls Description Document Raysalipraful Wiggins Fall Risk and appropriate interventions in the flowsheet. Outcome: Progressing Towards Goal 
  
Problem: Patient Education: Go to Patient Education Activity Goal: Patient/Family Education Outcome: Progressing Towards Goal 
  
Problem: Pressure Injury - Risk of 
Goal: *Prevention of pressure injury Description Document Bebo Scale and appropriate interventions in the flowsheet. Outcome: Progressing Towards Goal 
  
Problem: Patient Education: Go to Patient Education Activity Goal: Patient/Family Education Outcome: Progressing Towards Goal

## 2019-03-30 NOTE — PROGRESS NOTES
Hospitalist Progress Note Admit Date:  3/29/2019  6:44 PM  
Name:  Bobby Hernadez Age:  80 y.o. 
:  3/19/1931 MRN:  [id number] PCP:  Unknown, Provider Treatment Team: Attending Provider: Vesna Apple MD; Charge Nurse: Carlos Lawrence Subjective:  
CC: fever, hypoxia, increased confusions Pt is an 80fyo male with PMH of AD, CKD, CHF. Pt presented via EMS from nursing facility with fever, hypoxia, worsening confusion. Pt daughter reported wheezing but this has resolved. Temp in .3, no clear source. WBC normal, chemistry unremarkable. Urine neg, blood cultures in process - NGTD. CXR neg. IV vanc and zosyn. IVF. Pt afebrile today. Supportive care. Objective:  
 
Patient Vitals for the past 24 hrs: 
 Temp Pulse Resp BP SpO2  
19 1513 97.7 °F (36.5 °C) 95 20 110/46 95 % 19 1140     97 % 19 1114 97.9 °F (36.6 °C) (!) 56 20 112/50 93 % 19 0841 98 °F (36.7 °C) (!) 58 22 120/47 96 % 19 0742     95 % 19 0445 97 °F (36.1 °C) (!) 54 17 110/51 94 % 19 2217 98.5 °F (36.9 °C) 67 17 131/65 94 % 19 2121  79  157/68 97 % 19 2110 99.7 °F (37.6 °C)  20    
19 2102    150/65 95 % 197  75 22  97 % 19  77 23  96 % 19    169/70   
19     97 % 19  89  159/73 94 % 19 2002  91  183/77 98 % 19 1921  89  187/81 93 % 19 1913  90 (!) 33  93 % 19 1909 (!) 103.3 °F (39.6 °C) 90 28 193/84 (!) 88 % Oxygen Therapy O2 Sat (%): 95 % (19 1513) Pulse via Oximetry: 76 beats per minute (19) O2 Device: Nasal cannula (19 1140) O2 Flow Rate (L/min): 4 l/min (19 1140) Intake/Output Summary (Last 24 hours) at 3/30/2019 1723 Last data filed at 3/30/2019 1140 Gross per 24 hour Intake 798.9 ml Output  Net 798.9 ml  
   
 
 REVIEW OF SYSTEMS: Comprehensive ROS performed and negative except as stated in HPI. Physical Examination: 
General:    Well nourished. Awake and alert. Oriented x 1 Head:  Normocephalic, atraumatic Eyes:  Extraocular movements intact CV:   RRR. No  Murmurs, clicks, or gallops Lungs:   Unlabored, no cyanosis Abdomen:   Soft, nondistended Extremities: Warm and dry. No cyanosis or edema. Skin:     No rashes or jaundice. Neuro:  No gross focal deficits Psych:  Mood and affect appropriate Data Review: 
I have reviewed all labs, meds, telemetry events, and studies from the last 24 hours. Recent Results (from the past 24 hour(s)) POC LACTIC ACID Collection Time: 03/29/19  7:10 PM  
Result Value Ref Range Lactic Acid (POC) 1.67 0.5 - 1.9 mmol/L  
CBC WITH AUTOMATED DIFF Collection Time: 03/29/19  7:12 PM  
Result Value Ref Range WBC 10.8 4.3 - 11.1 K/uL  
 RBC 3.99 (L) 4.23 - 5.6 M/uL  
 HGB 13.1 (L) 13.6 - 17.2 g/dL HCT 39.7 (L) 41.1 - 50.3 % MCV 99.5 (H) 79.6 - 97.8 FL  
 MCH 32.8 26.1 - 32.9 PG  
 MCHC 33.0 31.4 - 35.0 g/dL  
 RDW 12.7 11.9 - 14.6 % PLATELET 936 453 - 968 K/uL MPV 9.4 9.4 - 12.3 FL ABSOLUTE NRBC 0.00 0.0 - 0.2 K/uL  
 DF AUTOMATED NEUTROPHILS 80 (H) 43 - 78 % LYMPHOCYTES 9 (L) 13 - 44 % MONOCYTES 8 4.0 - 12.0 % EOSINOPHILS 2 0.5 - 7.8 % BASOPHILS 0 0.0 - 2.0 % IMMATURE GRANULOCYTES 0 0.0 - 5.0 %  
 ABS. NEUTROPHILS 8.7 (H) 1.7 - 8.2 K/UL  
 ABS. LYMPHOCYTES 1.0 0.5 - 4.6 K/UL  
 ABS. MONOCYTES 0.9 0.1 - 1.3 K/UL  
 ABS. EOSINOPHILS 0.2 0.0 - 0.8 K/UL  
 ABS. BASOPHILS 0.0 0.0 - 0.2 K/UL  
 ABS. IMM. GRANS. 0.0 0.0 - 0.5 K/UL METABOLIC PANEL, COMPREHENSIVE Collection Time: 03/29/19  7:12 PM  
Result Value Ref Range Sodium 139 136 - 145 mmol/L Potassium 3.7 3.5 - 5.1 mmol/L Chloride 104 98 - 107 mmol/L  
 CO2 26 21 - 32 mmol/L Anion gap 9 7 - 16 mmol/L Glucose 122 (H) 65 - 100 mg/dL  BUN 20 8 - 23 MG/DL  
 Creatinine 1.27 0.8 - 1.5 MG/DL  
 GFR est AA >60 >60 ml/min/1.73m2 GFR est non-AA 57 (L) >60 ml/min/1.73m2 Calcium 8.7 8.3 - 10.4 MG/DL Bilirubin, total 0.5 0.2 - 1.1 MG/DL  
 ALT (SGPT) 13 12 - 65 U/L  
 AST (SGOT) 12 (L) 15 - 37 U/L Alk. phosphatase 69 50 - 136 U/L Protein, total 7.8 6.3 - 8.2 g/dL Albumin 3.6 3.2 - 4.6 g/dL Globulin 4.2 (H) 2.3 - 3.5 g/dL A-G Ratio 0.9 (L) 1.2 - 3.5 PROCALCITONIN Collection Time: 03/29/19  7:12 PM  
Result Value Ref Range Procalcitonin <0.1 ng/mL TROPONIN I Collection Time: 03/29/19  7:12 PM  
Result Value Ref Range Troponin-I, Qt. 0.03 0.02 - 0.05 NG/ML  
CULTURE, BLOOD Collection Time: 03/29/19  7:12 PM  
Result Value Ref Range Special Requests: RIGHT Antecubital 
    
 Culture result: NO GROWTH AFTER 9 HOURS    
CULTURE, BLOOD Collection Time: 03/29/19  7:12 PM  
Result Value Ref Range Special Requests: LEFT 
FOREARM Culture result: NO GROWTH AFTER 9 HOURS    
BNP Collection Time: 03/29/19  7:12 PM  
Result Value Ref Range  (H) 0 pg/mL EKG, 12 LEAD, INITIAL Collection Time: 03/29/19  7:15 PM  
Result Value Ref Range Ventricular Rate 89 BPM  
 Atrial Rate 227 BPM  
 QRS Duration 70 ms Q-T Interval 332 ms QTC Calculation (Bezet) 403 ms Calculated R Axis 53 degrees Calculated T Axis 101 degrees Diagnosis    
  !!! Poor data quality, interpretation may be adversely affected 
likely sinus rhythm T wave abnormality, consider lateral ischemia Confirmed by Alessandro Frazier MD (), VLADISLAV NOBLE ([id number]) on 3/30/2019 11:44:15 AM 
  
URINALYSIS W/ RFLX MICROSCOPIC Collection Time: 03/29/19  8:07 PM  
Result Value Ref Range Color YELLOW Appearance CLEAR Specific gravity 1.020 1.001 - 1.023    
 pH (UA) 5.0 5.0 - 9.0 Protein 30 (A) NEG mg/dL Glucose NEGATIVE  mg/dL Ketone NEGATIVE  NEG mg/dL Bilirubin NEGATIVE  NEG  Blood NEGATIVE  NEG    
 Urobilinogen 0.2 0.2 - 1.0 EU/dL Nitrites NEGATIVE  NEG Leukocyte Esterase NEGATIVE  NEG    
 WBC 0-3 0 /hpf  
 RBC 0 0 /hpf Epithelial cells 0-3 0 /hpf Bacteria 0 0 /hpf Casts 0 0 /lpf INFLUENZA A & B AG (RAPID TEST) Collection Time: 03/29/19  8:07 PM  
Result Value Ref Range Influenza A Ag NEGATIVE  NEG Influenza B Ag NEGATIVE  NEG Source NASOPHARYNGEAL    
AMMONIA Collection Time: 03/29/19  9:20 PM  
Result Value Ref Range Ammonia 21 11 - 32 UMOL/L  
METABOLIC PANEL, BASIC Collection Time: 03/30/19  4:24 AM  
Result Value Ref Range Sodium 141 136 - 145 mmol/L Potassium 3.7 3.5 - 5.1 mmol/L Chloride 108 (H) 98 - 107 mmol/L  
 CO2 26 21 - 32 mmol/L Anion gap 7 7 - 16 mmol/L Glucose 88 65 - 100 mg/dL BUN 22 8 - 23 MG/DL Creatinine 1.28 0.8 - 1.5 MG/DL  
 GFR est AA >60 >60 ml/min/1.73m2 GFR est non-AA 56 (L) >60 ml/min/1.73m2 Calcium 8.0 (L) 8.3 - 10.4 MG/DL  
CBC WITH AUTOMATED DIFF Collection Time: 03/30/19  4:24 AM  
Result Value Ref Range WBC 9.1 4.3 - 11.1 K/uL  
 RBC 3.33 (L) 4.23 - 5.6 M/uL  
 HGB 11.1 (L) 13.6 - 17.2 g/dL HCT 34.1 (L) 41.1 - 50.3 % .4 (H) 79.6 - 97.8 FL  
 MCH 33.3 (H) 26.1 - 32.9 PG  
 MCHC 32.6 31.4 - 35.0 g/dL  
 RDW 12.8 11.9 - 14.6 % PLATELET 313 112 - 434 K/uL MPV 10.1 9.4 - 12.3 FL ABSOLUTE NRBC 0.00 0.0 - 0.2 K/uL  
 DF AUTOMATED NEUTROPHILS 67 43 - 78 % LYMPHOCYTES 17 13 - 44 % MONOCYTES 10 4.0 - 12.0 % EOSINOPHILS 6 0.5 - 7.8 % BASOPHILS 0 0.0 - 2.0 % IMMATURE GRANULOCYTES 0 0.0 - 5.0 %  
 ABS. NEUTROPHILS 6.1 1.7 - 8.2 K/UL  
 ABS. LYMPHOCYTES 1.5 0.5 - 4.6 K/UL  
 ABS. MONOCYTES 0.9 0.1 - 1.3 K/UL  
 ABS. EOSINOPHILS 0.5 0.0 - 0.8 K/UL  
 ABS. BASOPHILS 0.0 0.0 - 0.2 K/UL  
 ABS. IMM. GRANS. 0.0 0.0 - 0.5 K/UL  
TSH 3RD GENERATION Collection Time: 03/30/19  4:24 AM  
Result Value Ref Range TSH 3.310 0.358 - 3.740 uIU/mL All Micro Results Procedure Component Value Units Date/Time CULTURE, BLOOD [[id number]] Collected:  03/29/19 1912 Order Status:  Completed Specimen:  Blood Updated:  03/30/19 0617 Special Requests: --     
  RIGHT Antecubital 
  
  Culture result: NO GROWTH AFTER 9 HOURS     
 CULTURE, BLOOD [[id number]] Collected:  03/29/19 1912 Order Status:  Completed Specimen:  Blood Updated:  03/30/19 0617 Special Requests: --     
  LEFT 
FOREARM Culture result: NO GROWTH AFTER 9 HOURS INFLUENZA A & B AG (RAPID TEST) [[id number]] Collected:  03/29/19 2007 Order Status:  Completed Specimen:  Nasopharyngeal from Nasal washing Updated:  03/29/19 2036 Influenza A Ag NEGATIVE Comment: NEGATIVE FOR THE PRESENCE OF INFLUENZA A ANTIGEN 
INFECTION DUE TO INFLUENZA A CANNOT BE RULED OUT. BECAUSE THE ANTIGEN PRESENT IN THE SAMPLE MAY BE BELOW 
THE DETECTION LIMIT OF THE TEST. A NEGATIVE TEST IS PRESUMPTIVE AND IT IS RECOMMENDED THAT THESE RESULTS BE CONFIRMED BY VIRAL CULTURE OR AN FDA-CLEARED INFLUENZA A AND B MOLECULAR ASSAY. Influenza B Ag NEGATIVE Comment: NEGATIVE FOR THE PRESENCE OF INFLUENZA B ANTIGEN 
INFECTION DUE TO INFLUENZA B CANNOT BE RULED OUT. BECAUSE THE ANTIGEN PRESENT IN THE SAMPLE MAY BE BELOW 
THE DETECTION LIMIT OF THE TEST. A NEGATIVE TEST IS PRESUMPTIVE AND IT IS RECOMMENDED THAT THESE RESULTS BE CONFIRMED BY VIRAL CULTURE OR AN FDA-CLEARED INFLUENZA A AND B MOLECULAR ASSAY. Source NASOPHARYNGEAL Current Meds: 
Current Facility-Administered Medications Medication Dose Route Frequency  escitalopram oxalate (LEXAPRO) tablet 10 mg  10 mg Oral DAILY  levothyroxine (SYNTHROID) tablet 75 mcg  75 mcg Oral ACB  rivastigmine (EXELON) 13.3 mg/24 hour patch 1 Patch  1 Patch TransDERmal DAILY  valproic acid (as sodium salt) (DEPAKENE) 250 mg/5 mL (5 mL) oral solution 250 mg  250 mg Oral Q12H  piperacillin-tazobactam (ZOSYN) 3.375 g in 0.9% sodium chloride (MBP/ADV) 100 mL  3.375 g IntraVENous Q8H  
 ARIPiprazole (ABILIFY) tablet 12 mg  12 mg Oral Q12H  
 [START ON 4/1/2019] ergocalciferol capsule 50,000 Units  50,000 Units Oral every Monday  tamsulosin (FLOMAX) capsule 0.4 mg  0.4 mg Oral DAILY  furosemide (LASIX) tablet 20 mg  20 mg Oral DAILY  memantine (NAMENDA) tablet 5 mg  5 mg Oral Q12H  
 amLODIPine (NORVASC) tablet 5 mg  5 mg Oral DAILY  aspirin delayed-release tablet 81 mg  81 mg Oral DAILY  losartan (COZAAR) tablet 100 mg  100 mg Oral DAILY  oxybutynin chloride XL (DITROPAN XL) tablet 5 mg  5 mg Oral QHS  pravastatin (PRAVACHOL) tablet 20 mg  20 mg Oral QHS  calcium-vitamin D (OS-JAONN) 500 mg-200 unit tablet  1 Tab Oral BID WITH MEALS  docusate sodium (COLACE) capsule 100 mg  100 mg Oral BID  ferrous sulfate tablet 325 mg  1 Tab Oral DAILY WITH BREAKFAST  metoprolol succinate (TOPROL-XL) XL tablet 12.5 mg  12.5 mg Oral DAILY  cyanocobalamin tablet 1,000 mcg  1,000 mcg Oral DAILY  folic acid-vit I6-TMM Z00 (FOLTX) 2.5-25-2 mg tablet 1 Tab  1 Tab Oral DAILY  traMADol (ULTRAM) tablet 50 mg  50 mg Oral Q6H PRN  
 albuterol (PROVENTIL VENTOLIN) nebulizer solution 2.5 mg  2.5 mg Nebulization Q4H PRN  
 sodium chloride (NS) flush 5-40 mL  5-40 mL IntraVENous Q8H  
 sodium chloride (NS) flush 5-40 mL  5-40 mL IntraVENous PRN  
 acetaminophen (TYLENOL) tablet 650 mg  650 mg Oral Q4H PRN  
 ondansetron (ZOFRAN) injection 4 mg  4 mg IntraVENous Q4H PRN  
 enoxaparin (LOVENOX) injection 40 mg  40 mg SubCUTAneous Q24H Diet: DIET CARDIAC 
DIET NUTRITIONAL SUPPLEMENTS Other Studies (last 24 hours): Xr Chest [facility] [place] [facility] Result Date: 3/29/2019 Portable chest: History: sepsis. Comparison: 09/17/2018 Findings: A single view of the chest was obtained at 1929 hours. The cardiac silhouette is borderline enlarged, unchanged.  The lungs and pleural spaces are clear. The pulmonary vascularity is within normal limits. Impression: Borderline enlarged cardiac silhouette with grossly clear lungs. Assessment and Plan:  
 
Hospital Problems as of 3/30/2019 Date Reviewed: 9/17/2018 Codes Class Noted - Resolved POA * (Principal) Fever ICD-10-CM: R50.9 ICD-9-CM: 780.60  3/29/2019 - Present Unknown Hypertension ICD-10-CM: I10 
ICD-9-CM: 401.9  Unknown - Present Yes  
   
 CHF (congestive heart failure) (HCC) ICD-10-CM: I50.9 ICD-9-CM: 428.0  Unknown - Present Yes Hypothyroidism ICD-10-CM: E03.9 ICD-9-CM: 244.9  Unknown - Present Yes Diabetes ([address] ICD-10-CM: E11.9 ICD-9-CM: 250.00  Unknown - Present Yes Delirium ICD-10-CM: R41.0 ICD-9-CM: 780.09  11/12/2016 - Present Yes A/P:   
Fever/Delirium 
- Unclear source - CXR is unremarkable - UA is normal 
- Flu is negative - No obvious source of infection at this time - Will continue Tylenol and IV abx as blood cultures are pending 
  
CHF 
- Not currently in exacerbation 
- Continue home meds 
  
DM2 
- Home meds 
- SSI 
  
Hypothyroidism 
- Home meds - Check TSH 
  
Alzheimer's Dementia - Patient lives in [address] very pleasantly confused - Daughter reports his confusion is slightly worse than baseline, but better than it was on presentation  
  
DC planning/Dispo:  Return to NH 
DVT ppx:  Lovenox Code status: DNR Medical decision maker: No MPOA on file,  Daughter is emergency contact Case reviewed with supervising physician - YVETTE Greenwood MD 
 
Signed: 
Geovanna Thacker NP-C

## 2019-03-30 NOTE — PROGRESS NOTES
03/29/19 2230 Dual Skin Pressure Injury Assessment Dual Skin Pressure Injury Assessment [facility] Second Care Provider (Based on [address] [place] [address]) Rona Vickers RN  
 
Pt arrived to floor via stretcher transport with  and daughter. Pt alert and oriented to person only. Dual skin assessment completed. No skin break down noted. Skin warm and dry. Small scab noted to right side of face and right lower leg.

## 2019-03-30 NOTE — PROGRESS NOTES
present for medical student's assessment, x-ray, assessment, medication, labs, and placement of catheter with Whitley Chester RN, and assessment with Dr. Bryan Cosby. Thank you, SHAD Mcgregor / 
Akbar Feng Patient Relations [facility] Interpreting Services 
c: [phone] / [email or web address] Fiona [email or web address] [address] / [place], [address] [facility] 
[email or web address]

## 2019-03-30 NOTE — PROGRESS NOTES
Attempted to assess pt and his neurological status. Used  phone and spoke with [facility] [place] [place] number [phone]. Pt refused to respond to  over the phone.

## 2019-03-30 NOTE — ED NOTES
TRANSFER - OUT REPORT: 
 
Verbal report given to ANNELISE Landis(name) on Palma Carroll  being transferred to 628(unit) for routine progression of care Report consisted of patients Situation, Background, Assessment and  
Recommendations(SBAR). Information from the following report(s) ED Summary, MAR, Recent Results, Med Rec Status and Cardiac Rhythm ACCELERATED JUNCTIONAL  was reviewed with the receiving nurse. Lines:  
Peripheral IV 03/29/19 Right Antecubital (Active) Site Assessment Clean, dry, & intact 3/29/2019  7:13 PM  
Phlebitis Assessment 0 3/29/2019  7:13 PM  
Infiltration Assessment 0 3/29/2019  7:13 PM  
Dressing Status Clean, dry, & intact 3/29/2019  7:13 PM  
   
Peripheral IV 03/29/19 Left Forearm (Active) Site Assessment Clean, dry, & intact 3/29/2019  7:13 PM  
Phlebitis Assessment 0 3/29/2019  7:13 PM  
Infiltration Assessment 0 3/29/2019  7:13 PM  
Dressing Status Clean, dry, & intact 3/29/2019  7:13 PM  
   
Peripheral IV 03/29/19 Left Wrist (Active) Site Assessment Clean, dry, & intact 3/29/2019  7:19 PM  
Phlebitis Assessment 0 3/29/2019  7:19 PM  
Infiltration Assessment 0 3/29/2019  7:19 PM  
Dressing Status Clean, dry, & intact 3/29/2019  7:19 PM  
Dressing Type Transparent 3/29/2019  7:19 PM  
Hub Color/Line Status Green 3/29/2019  7:19 PM  
  
 
Opportunity for questions and clarification was provided. Patient transported with: 
 O2 @ 3 liters Tech

## 2019-03-30 NOTE — PROGRESS NOTES
present for assessment with hospitalist Dr. Michelle Arthur and for transport to room [id number] and admission database. Thank you, Lelia Harkins, SHAD STOVALL / 
James Dent Patient Relations & Interpreting Services 
c: [phone] / [email or web address] Fiona Newman Do [facility] [address] / [place], [address] [facility] 
[email or web address]

## 2019-03-30 NOTE — ED NOTES
Pt is more alert and talking more with family. Per family, they state he also seems more alert than when he first got to our facility.

## 2019-03-30 NOTE — H&P
HOSPITALIST H&P/CONSULTNAME:  Natty Mark Age:  80 y.o. 
:   3/19/1931 MRN:   [id number] PCP: Unknown, Provider Consulting MD: Treatment Team: Attending Provider: Aurelia Lr MD 
HPI:  
Patient is an 80yoM with PMH significant for Alzheimer's dementia, CKD, CHF who presents from his nursing home with fever, hypoxia, and worsened confusion. Patient's daughter reports some wheezing earlier, but that has resolved. Daughter also reports that he seems a little less confused, but still not at baseline. Work-up in ER has overall been unremarkable. Given charted fever of 103.3 in the ER without obvious source, hospitalist were called for observation for further work-up. He currently has no specific complaints, but does not answer questions directly and needs re-direction by daughter. Unable to complete ROS 2/2 patient's mentation. Past Medical History:  
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  
Procedure Laterality Date  HX CHOLECYSTECTOMY  HX HEMORRHOIDECTOMY  HX PROSTATECTOMY Prior to Admission Medications Prescriptions Last Dose Informant Patient Reported? Taking? ALPRAZolam (XANAX) 0.25 mg tablet   No No  
Sig: Take 1 Tab by mouth two (2) times daily as needed for Anxiety.  Max Daily Amount: 0.5 mg.  
 DULoxetine (CYMBALTA) 60 mg capsule   Yes No  
Sig: Take 60 mg by mouth two (2) times a day. QUEtiapine (SEROQUEL) 25 mg tablet   Yes No  
Sig: Take  by mouth. albuterol (VENTOLIN HFA) 90 mcg/actuation inhaler   Yes No  
Sig: Take  by inhalation. alendronate (FOSAMAX) 70 mg tablet   Yes No  
Sig: Take 70 mg by mouth every Sunday. amLODIPine (NORVASC) 5 mg tablet   Yes No  
Sig: Take 5 mg by mouth daily. aspirin delayed-release 81 mg tablet   Yes No  
Sig: Take 81 mg by mouth daily. calcium-cholecalciferol, d3, 600 mg calcium- 200 unit cap   Yes No  
Sig: Take 1 Tab by mouth two (2) times a day. cyanocobalamin 1,000 mcg tablet   Yes No  
Sig: Take 1,000 mcg by mouth daily. docusate sodium (COLACE) 100 mg capsule   Yes No  
Sig: Take 100 mg by mouth two (2) times a day. donepezil (ARICEPT) 10 mg tablet   Yes No  
Sig: Take 10 mg by mouth nightly.  
ergocalciferol (DRISDOL) 50,000 unit capsule   Yes No  
Sig: Take 50,000 Units by mouth every Monday. ferrous sulfate (IRON) 325 mg (65 mg iron) tablet   Yes No  
Sig: Take  by mouth two (2) times a day. folic acid-vit [id number] U85 (FOLTX) 2.5-25-2 mg tablet   Yes No  
Sig: Take 1 Tab by mouth daily. furosemide (LASIX) 40 mg tablet   Yes No  
Sig: Take 40 mg by mouth daily. levothyroxine (SYNTHROID) 88 mcg tablet   No No  
Sig: Take 1 Tab by mouth Daily (before breakfast). losartan (COZAAR) 100 mg tablet   Yes No  
Sig: Take 100 mg by mouth daily. memantine ER (NAMENDA XR) 28 mg capsule   Yes No  
Sig: Take 28 mg by mouth daily. metoprolol succinate (TOPROL-XL) 25 mg XL tablet   No No  
Sig: Take 1 Tab by mouth daily. nitroglycerin (NITROSTAT) 0.4 mg SL tablet   Yes No  
Sig: by SubLINGual route every five (5) minutes as needed for Chest Pain. nitroglycerin (NITROSTAT) 0.4 mg SL tablet   Yes No  
Sig: by SubLINGual route every five (5) minutes as needed for Chest Pain.   
omeprazole (PRILOSEC) 20 mg capsule   Yes No  
 Sig: Take 20 mg by mouth daily. oxybutynin chloride XL (DITROPAN XL) 5 mg CR tablet   Yes No  
Sig: Take 5 mg by mouth nightly. pravastatin (PRAVACHOL) 20 mg tablet   Yes No  
Sig: Take 20 mg by mouth nightly. sertraline (ZOLOFT) 50 mg tablet   Yes No  
Sig: Take  by mouth daily. tamsulosin (FLOMAX) 0.4 mg capsule   Yes No  
Sig: Take 0.4 mg by mouth daily. traMADol (ULTRAM) 50 mg tablet   Yes No  
Sig: Take 50 mg by mouth every six (6) hours as needed for Pain. Facility-Administered Medications: None No Known Allergies Social History Tobacco Use  Smoking status: Unknown If Ever Smoked  Tobacco comment: FORMER SMOKER 8 CIGS/DAY QUIT 25 YRS AGO Substance Use Topics  Alcohol use: No  
  Comment: WHISKEY, OCC History reviewed. No pertinent family history. Objective:  
 
Visit Vitals /65 (BP 1 Location: Right arm) Pulse 67 Temp 98.5 °F (36.9 °C) Resp 17 Ht 5' 5\" (1.651 m) Wt 72.6 kg (160 lb) SpO2 94% BMI 26.63 kg/m² Temp (24hrs), Av.5 °F (38.1 °C), Min:98.5 °F (36.9 °C), Max:103.3 °F (39.6 °C) Oxygen Therapy O2 Sat (%): 94 % (19) Pulse via Oximetry: 76 beats per minute (19) O2 Device: Nasal cannula (19) O2 Flow Rate (L/min): 4 l/min (19) Physical Exam: 
General:    Alert, cooperative, no distress, appears stated age. Head:   Normocephalic, without obvious abnormality, atraumatic. Nose:  Nares normal. No drainage or sinus tenderness. Lungs:   Clear to auscultation bilaterally. No Wheezing or Rhonchi. No rales. Heart:   Regular rate and rhythm,  no murmur, rub or gallop. Abdomen:   Soft, non-tender. Not distended. Bowel sounds normal.  
Extremities: No cyanosis. No edema. No clubbing Skin:     Texture, turgor normal. No rashes or lesions. Not Jaundiced Neurologic: Alert, only oriented to person. no focal deficits Data Review:  
Recent Results (from the past 24 hour(s)) POC LACTIC ACID Collection Time: 03/29/19  7:10 PM  
Result Value Ref Range Lactic Acid (POC) 1.67 0.5 - 1.9 mmol/L  
CBC WITH AUTOMATED DIFF Collection Time: 03/29/19  7:12 PM  
Result Value Ref Range WBC 10.8 4.3 - 11.1 K/uL  
 RBC 3.99 (L) 4.23 - 5.6 M/uL  
 HGB 13.1 (L) 13.6 - 17.2 g/dL HCT 39.7 (L) 41.1 - 50.3 % MCV 99.5 (H) 79.6 - 97.8 FL  
 MCH 32.8 26.1 - 32.9 PG  
 MCHC 33.0 31.4 - 35.0 g/dL  
 RDW 12.7 11.9 - 14.6 % PLATELET 263 832 - 099 K/uL MPV 9.4 9.4 - 12.3 FL ABSOLUTE NRBC 0.00 0.0 - 0.2 K/uL  
 DF AUTOMATED NEUTROPHILS 80 (H) 43 - 78 % LYMPHOCYTES 9 (L) 13 - 44 % MONOCYTES 8 4.0 - 12.0 % EOSINOPHILS 2 0.5 - 7.8 % BASOPHILS 0 0.0 - 2.0 % IMMATURE GRANULOCYTES 0 0.0 - 5.0 %  
 ABS. NEUTROPHILS 8.7 (H) 1.7 - 8.2 K/UL  
 ABS. LYMPHOCYTES 1.0 0.5 - 4.6 K/UL  
 ABS. MONOCYTES 0.9 0.1 - 1.3 K/UL  
 ABS. EOSINOPHILS 0.2 0.0 - 0.8 K/UL  
 ABS. BASOPHILS 0.0 0.0 - 0.2 K/UL  
 ABS. IMM. GRANS. 0.0 0.0 - 0.5 K/UL METABOLIC PANEL, COMPREHENSIVE Collection Time: 03/29/19  7:12 PM  
Result Value Ref Range Sodium 139 136 - 145 mmol/L Potassium 3.7 3.5 - 5.1 mmol/L Chloride 104 98 - 107 mmol/L  
 CO2 26 21 - 32 mmol/L Anion gap 9 7 - 16 mmol/L Glucose 122 (H) 65 - 100 mg/dL BUN 20 8 - 23 MG/DL Creatinine 1.27 0.8 - 1.5 MG/DL  
 GFR est AA >60 >60 ml/min/1.73m2 GFR est non-AA 57 (L) >60 ml/min/1.73m2 Calcium 8.7 8.3 - 10.4 MG/DL Bilirubin, total 0.5 0.2 - 1.1 MG/DL  
 ALT (SGPT) 13 12 - 65 U/L  
 AST (SGOT) 12 (L) 15 - 37 U/L Alk. phosphatase 69 50 - 136 U/L Protein, total 7.8 6.3 - 8.2 g/dL Albumin 3.6 3.2 - 4.6 g/dL Globulin 4.2 (H) 2.3 - 3.5 g/dL A-G Ratio 0.9 (L) 1.2 - 3.5 PROCALCITONIN Collection Time: 03/29/19  7:12 PM  
Result Value Ref Range Procalcitonin <0.1 ng/mL TROPONIN I Collection Time: 03/29/19  7:12 PM  
Result Value Ref Range  Troponin-I, Qt. 0.03 0.02 - 0.05 NG/ML  
BNP  
 Collection Time: 03/29/19  7:12 PM  
Result Value Ref Range  (H) 0 pg/mL EKG, 12 LEAD, INITIAL Collection Time: 03/29/19  7:15 PM  
Result Value Ref Range Ventricular Rate 89 BPM  
 Atrial Rate 227 BPM  
 QRS Duration 70 ms Q-T Interval 332 ms QTC Calculation (Bezet) 403 ms Calculated R Axis 53 degrees Calculated T Axis 101 degrees Diagnosis    
  !! AGE AND GENDER SPECIFIC ECG ANALYSIS !! Accelerated Junctional rhythm Anterior infarct (cited on or before 17-SEP-2018) T wave abnormality, consider lateral ischemia Abnormal ECG When compared with ECG of 29-MAR-2019 18:52, 
Previous ECG has undetermined rhythm, needs review URINALYSIS W/ RFLX MICROSCOPIC Collection Time: 03/29/19  8:07 PM  
Result Value Ref Range Color YELLOW Appearance CLEAR Specific gravity 1.020 1.001 - 1.023    
 pH (UA) 5.0 5.0 - 9.0 Protein 30 (A) NEG mg/dL Glucose NEGATIVE  mg/dL Ketone NEGATIVE  NEG mg/dL Bilirubin NEGATIVE  NEG Blood NEGATIVE  NEG Urobilinogen 0.2 0.2 - 1.0 EU/dL Nitrites NEGATIVE  NEG Leukocyte Esterase NEGATIVE  NEG    
 WBC 0-3 0 /hpf  
 RBC 0 0 /hpf Epithelial cells 0-3 0 /hpf Bacteria 0 0 /hpf Casts 0 0 /lpf INFLUENZA A & B AG (RAPID TEST) Collection Time: 03/29/19  8:07 PM  
Result Value Ref Range Influenza A Ag NEGATIVE  NEG Influenza B Ag NEGATIVE  NEG Source NASOPHARYNGEAL    
AMMONIA Collection Time: 03/29/19  9:20 PM  
Result Value Ref Range Ammonia 21 11 - 32 UMOL/L Imaging Mickie Due Katelyn Pina Assessment and Plan: Active Hospital Problems Diagnosis Date Noted  Fever 03/29/2019  CHF (congestive heart failure) [facility]  Diabetes ([facility]  Hypothyroidism  Hypertension  Delirium 11/12/2016 PLAN Fever/Delirium 
- Unclear source - CXR is unremarkable - UA is normal 
- Flu is negative - No obvious source of infection at this time - Will continue Tylenol and IV abx as blood cultures are pending CHF 
- Not currently in exacerbation 
- Continue home meds DM2 
- Home meds 
- SSI Hypothyroidism 
- Home meds - Check TSH Alzheimer's Dementia - Patient lives [address] very pleasantly confused - Daughter reports his confusion is slightly worse than baseline, but better than it was on presentation Code Status: DNR Anticipated discharge: 1-2 days, pending work-up. Signed By: Amelia Bardales MD   
 March 29, 2019

## 2019-03-30 NOTE — PROGRESS NOTES
TRANSFER - IN REPORT: 
 
Verbal report received from [facility] on Sherie Louie  being received from ED for routine progression of care Report consisted of patients Situation, Background, Assessment and  
Recommendations(SBAR). Information from the following report(s) SBAR, Kardex, ED Summary and MAR was reviewed with the receiving nurse. Opportunity for questions and clarification was provided. Assessment completed upon patients arrival to unit and care assumed.

## 2019-03-30 NOTE — PROGRESS NOTES
Problem: Falls - Risk of 
Goal: *Absence of Falls Description Document Yohannes Holly Fall Risk and appropriate interventions in the flowsheet. Outcome: Progressing Towards Goal 
  
Problem: Pressure Injury - Risk of 
Goal: *Prevention of pressure injury Description Document Bebo Scale and appropriate interventions in the flowsheet. Outcome: Progressing Towards Goal 
 Patient has no skin breakdown. Bed alarm in place.

## 2019-03-30 NOTE — PROGRESS NOTES
Hospitalist contacted and made aware of ED dysphagia screening. Hospitalist aware and already assessed pt swallow and okayed to give medication and keep pt on current Cardiac diet as swallow was never an issue. Pt O2 sat low and with decreased alertness in ED. At this time pt alert and O2 sat WDL.

## 2019-03-30 NOTE — PROGRESS NOTES
Hospitalist contacted and made aware of PTA med list being updated and will review for any needed medication updates.

## 2019-03-31 ENCOUNTER — APPOINTMENT (OUTPATIENT)
Dept: CT IMAGING | Age: 84
DRG: 871 | End: 2019-03-31
Attending: NURSE PRACTITIONER
Payer: MEDICARE

## 2019-03-31 LAB
ANION GAP SERPL CALC-SCNC: 4 MMOL/L (ref 7–16)
BASOPHILS # BLD: 0.1 K/UL (ref 0–0.2)
BASOPHILS NFR BLD: 1 % (ref 0–2)
BUN SERPL-MCNC: 24 MG/DL (ref 8–23)
CALCIUM SERPL-MCNC: 8.4 MG/DL (ref 8.3–10.4)
CHLORIDE SERPL-SCNC: 108 MMOL/L (ref 98–107)
CO2 SERPL-SCNC: 28 MMOL/L (ref 21–32)
CREAT SERPL-MCNC: 1.46 MG/DL (ref 0.8–1.5)
DIFFERENTIAL METHOD BLD: ABNORMAL
EOSINOPHIL # BLD: 0.7 K/UL (ref 0–0.8)
EOSINOPHIL NFR BLD: 9 % (ref 0.5–7.8)
ERYTHROCYTE [DISTWIDTH] IN BLOOD BY AUTOMATED COUNT: 12.5 % (ref 11.9–14.6)
GLUCOSE SERPL-MCNC: 80 MG/DL (ref 65–100)
HCT VFR BLD AUTO: 36.1 % (ref 41.1–50.3)
HGB BLD-MCNC: 11.7 G/DL (ref 13.6–17.2)
IMM GRANULOCYTES # BLD AUTO: 0 K/UL (ref 0–0.5)
IMM GRANULOCYTES NFR BLD AUTO: 0 % (ref 0–5)
LYMPHOCYTES # BLD: 1.5 K/UL (ref 0.5–4.6)
LYMPHOCYTES NFR BLD: 17 % (ref 13–44)
MCH RBC QN AUTO: 33.1 PG (ref 26.1–32.9)
MCHC RBC AUTO-ENTMCNC: 32.4 G/DL (ref 31.4–35)
MCV RBC AUTO: 102.3 FL (ref 79.6–97.8)
MONOCYTES # BLD: 0.9 K/UL (ref 0.1–1.3)
MONOCYTES NFR BLD: 10 % (ref 4–12)
NEUTS SEG # BLD: 5.3 K/UL (ref 1.7–8.2)
NEUTS SEG NFR BLD: 63 % (ref 43–78)
NRBC # BLD: 0 K/UL (ref 0–0.2)
PLATELET # BLD AUTO: 195 K/UL (ref 150–450)
PMV BLD AUTO: 10 FL (ref 9.4–12.3)
POTASSIUM SERPL-SCNC: 3.9 MMOL/L (ref 3.5–5.1)
RBC # BLD AUTO: 3.53 M/UL (ref 4.23–5.6)
SODIUM SERPL-SCNC: 140 MMOL/L (ref 136–145)
WBC # BLD AUTO: 8.4 K/UL (ref 4.3–11.1)

## 2019-03-31 PROCEDURE — 85025 COMPLETE CBC W/AUTO DIFF WBC: CPT

## 2019-03-31 PROCEDURE — 77030021668 HC NEB PREFIL KT VYRM -A

## 2019-03-31 PROCEDURE — 99218 HC RM OBSERVATION: CPT

## 2019-03-31 PROCEDURE — 74176 CT ABD & PELVIS W/O CONTRAST: CPT

## 2019-03-31 PROCEDURE — 94760 N-INVAS EAR/PLS OXIMETRY 1: CPT

## 2019-03-31 PROCEDURE — 74011000258 HC RX REV CODE- 258: Performed by: FAMILY MEDICINE

## 2019-03-31 PROCEDURE — 77010033678 HC OXYGEN DAILY

## 2019-03-31 PROCEDURE — 74011250637 HC RX REV CODE- 250/637: Performed by: FAMILY MEDICINE

## 2019-03-31 PROCEDURE — 74011000250 HC RX REV CODE- 250: Performed by: FAMILY MEDICINE

## 2019-03-31 PROCEDURE — 77030020263 HC SOL INJ SOD CL0.9% LFCR 1000ML

## 2019-03-31 PROCEDURE — 77030019605

## 2019-03-31 PROCEDURE — 74011250636 HC RX REV CODE- 250/636: Performed by: FAMILY MEDICINE

## 2019-03-31 PROCEDURE — 74011636320 HC RX REV CODE- 636/320: Performed by: FAMILY MEDICINE

## 2019-03-31 PROCEDURE — 36415 COLL VENOUS BLD VENIPUNCTURE: CPT

## 2019-03-31 PROCEDURE — 80048 BASIC METABOLIC PNL TOTAL CA: CPT

## 2019-03-31 PROCEDURE — 74011250637 HC RX REV CODE- 250/637: Performed by: NURSE PRACTITIONER

## 2019-03-31 PROCEDURE — 94640 AIRWAY INHALATION TREATMENT: CPT

## 2019-03-31 RX ORDER — LEVOFLOXACIN 500 MG/1
500 TABLET, FILM COATED ORAL
Status: DISCONTINUED | OUTPATIENT
Start: 2019-03-31 | End: 2019-04-01

## 2019-03-31 RX ORDER — SODIUM CHLORIDE 0.9 % (FLUSH) 0.9 %
10 SYRINGE (ML) INJECTION
Status: ACTIVE | OUTPATIENT
Start: 2019-03-31 | End: 2019-04-01

## 2019-03-31 RX ADMIN — FERROUS SULFATE TAB 325 MG (65 MG ELEMENTAL FE) 325 MG: 325 (65 FE) TAB at 09:03

## 2019-03-31 RX ADMIN — ASPIRIN 81 MG: 81 TABLET, COATED ORAL at 09:03

## 2019-03-31 RX ADMIN — METOPROLOL SUCCINATE 12.5 MG: 25 TABLET, EXTENDED RELEASE ORAL at 09:02

## 2019-03-31 RX ADMIN — ARIPIPRAZOLE 12 MG: 10 TABLET ORAL at 21:16

## 2019-03-31 RX ADMIN — DOCUSATE SODIUM 100 MG: 100 CAPSULE, LIQUID FILLED ORAL at 16:14

## 2019-03-31 RX ADMIN — Medication 10 ML: at 05:10

## 2019-03-31 RX ADMIN — Medication 1 TABLET: at 09:00

## 2019-03-31 RX ADMIN — LOSARTAN POTASSIUM 100 MG: 50 TABLET ORAL at 09:02

## 2019-03-31 RX ADMIN — TRAMADOL HYDROCHLORIDE 50 MG: 50 TABLET, COATED ORAL at 09:52

## 2019-03-31 RX ADMIN — DIATRIZOATE MEGLUMINE AND DIATRIZOATE SODIUM 15 ML: 660; 100 LIQUID ORAL; RECTAL at 16:01

## 2019-03-31 RX ADMIN — VALPROIC ACID 250 MG: 250 SOLUTION ORAL at 21:16

## 2019-03-31 RX ADMIN — ENOXAPARIN SODIUM 40 MG: 40 INJECTION SUBCUTANEOUS at 23:28

## 2019-03-31 RX ADMIN — PRAVASTATIN SODIUM 20 MG: 20 TABLET ORAL at 21:16

## 2019-03-31 RX ADMIN — FUROSEMIDE 20 MG: 20 TABLET ORAL at 09:01

## 2019-03-31 RX ADMIN — CALCIUM CARBONATE 500 MG (1,250 MG)-VITAMIN D3 200 UNIT TABLET 1 TABLET: at 16:13

## 2019-03-31 RX ADMIN — ALBUTEROL SULFATE 2.5 MG: 2.5 SOLUTION RESPIRATORY (INHALATION) at 02:25

## 2019-03-31 RX ADMIN — DOCUSATE SODIUM 100 MG: 100 CAPSULE, LIQUID FILLED ORAL at 09:00

## 2019-03-31 RX ADMIN — OXYBUTYNIN CHLORIDE 5 MG: 5 TABLET, EXTENDED RELEASE ORAL at 21:16

## 2019-03-31 RX ADMIN — MEMANTINE HYDROCHLORIDE 5 MG: 5 TABLET ORAL at 21:16

## 2019-03-31 RX ADMIN — CYANOCOBALAMIN TAB 1000 MCG 1000 MCG: 1000 TAB at 09:02

## 2019-03-31 RX ADMIN — Medication 10 ML: at 13:24

## 2019-03-31 RX ADMIN — PIPERACILLIN AND TAZOBACTAM 3.38 G: 3; .375 INJECTION, POWDER, FOR SOLUTION INTRAVENOUS at 02:20

## 2019-03-31 RX ADMIN — ESCITALOPRAM OXALATE 10 MG: 10 TABLET ORAL at 09:01

## 2019-03-31 RX ADMIN — VALPROIC ACID 250 MG: 250 SOLUTION ORAL at 09:03

## 2019-03-31 RX ADMIN — TRAMADOL HYDROCHLORIDE 50 MG: 50 TABLET, COATED ORAL at 02:20

## 2019-03-31 RX ADMIN — TAMSULOSIN HYDROCHLORIDE 0.4 MG: 0.4 CAPSULE ORAL at 09:00

## 2019-03-31 RX ADMIN — LEVOTHYROXINE SODIUM 75 MCG: 75 TABLET ORAL at 05:10

## 2019-03-31 RX ADMIN — LEVOFLOXACIN 500 MG: 500 TABLET, FILM COATED ORAL at 09:00

## 2019-03-31 RX ADMIN — AMLODIPINE BESYLATE 5 MG: 5 TABLET ORAL at 09:02

## 2019-03-31 RX ADMIN — Medication 10 ML: at 21:16

## 2019-03-31 RX ADMIN — ARIPIPRAZOLE 12 MG: 10 TABLET ORAL at 09:02

## 2019-03-31 RX ADMIN — MEMANTINE HYDROCHLORIDE 5 MG: 5 TABLET ORAL at 09:03

## 2019-03-31 RX ADMIN — CALCIUM CARBONATE 500 MG (1,250 MG)-VITAMIN D3 200 UNIT TABLET 1 TABLET: at 09:00

## 2019-03-31 NOTE — PROGRESS NOTES
Patient has bed alarm on and has a foam silicone dressing on his bottom with a pressure reduction mattress

## 2019-03-31 NOTE — PROGRESS NOTES
Problem: Falls - Risk of 
Goal: *Absence of Falls Description Document Martha Medina Fall Risk and appropriate interventions in the flowsheet. 3/30/2019 2032 by Mery Guaman RN Outcome: Progressing Towards Goal 
3/30/2019 2032 by Mery Guaman RN Outcome: Progressing Towards Goal 
  
Problem: Pressure Injury - Risk of 
Goal: *Prevention of pressure injury Description Document Bebo Scale and appropriate interventions in the flowsheet. 3/30/2019 2032 by Mery Guaman RN Outcome: Progressing Towards Goal 
3/30/2019 2032 by Mery Guaman RN Outcome: Progressing Towards Goal 
  
Problem: Communication Impaired (Adult) Goal: *Speech Goal: (INSERT TEXT) Description Romanian speaking 3/30/2019 2032 by Mery Guaman RN Outcome: Progressing Towards Goal 
Note:  services available 3/30/2019 2032 by Mery Guaman RN Outcome: Progressing Towards Goal

## 2019-03-31 NOTE — PROGRESS NOTES
Hospitalist Progress Note Admit Date:  3/29/2019  6:44 PM  
Name:  Darcy Both Age:  80 y.o. 
:  3/19/1931 MRN:  [id number] PCP:  Unknown, Provider Treatment Team: Attending Provider: Ana M Mayorga MD; Charge Nurse: Darrell Lopez; Utilization Review: Sarbjit Daniels RN Subjective:  
CC: fever, hypoxia, increased confusions Pt is an 80fyo male with PMH of AD, CKD, CHF. Pt presented via EMS from nursing facility with fever, hypoxia, worsening confusion. Pt daughter reported wheezing but this has resolved. Temp in .3, no clear source. WBC normal, chemistry unremarkable. Urine neg, blood cultures in process - NGTD. CXR neg. IV vanc and zosyn. IVF. Pt afebrile but is indicating pain in his RLQ via staff member who speaks Turkish. Will get stat CT abd/pelvis. Objective:  
 
Patient Vitals for the past 24 hrs: 
 Temp Pulse Resp BP SpO2  
19 1122 97.2 °F (36.2 °C) 60 20 122/52 94 % 19 0858     99 % 19 0835 98.4 °F (36.9 °C) 67 22 136/52 90 % 19 0608 98 °F (36.7 °C) 69 18 143/61 92 % 19 0225     95 % 19 2318 98.2 °F (36.8 °C) 82 22 172/55 91 % 19 2046 98.8 °F (37.1 °C) 69 21 152/64 93 % 19 1952 98.8 °F (37.1 °C) 71 21 137/64 95 % 19 1513 97.7 °F (36.5 °C) 95 20 110/46 95 % Oxygen Therapy O2 Sat (%): 94 % (19 1122) Pulse via Oximetry: 60 beats per minute (19 0225) O2 Device: Nasal cannula (19) O2 Flow Rate (L/min): 4 l/min(weaned to 3) (19) Intake/Output Summary (Last 24 hours) at 3/31/2019 1425 Last data filed at 3/31/2019 2811 Gross per 24 hour Intake 799 ml Output  Net 799 ml REVIEW OF SYSTEMS: Comprehensive ROS performed and negative except as stated in HPI. Physical Examination: 
General:    Well nourished. Awake and alert. Oriented x 1 Head:  Normocephalic, atraumatic Eyes:  Extraocular movements intact CV: RRR. No  Murmurs, clicks, or gallops Lungs:   Unlabored, no cyanosis Abdomen:   Soft, nondistended, indicates pain RLQc Extremities: Warm and dry. No cyanosis or edema. Skin:     No rashes or jaundice. Neuro:  No gross focal deficits Psych:  Mood and affect appropriate Data Review: 
I have reviewed all labs, meds, telemetry events, and studies from the last 24 hours. Recent Results (from the past 24 hour(s)) METABOLIC PANEL, BASIC Collection Time: 03/31/19  3:19 AM  
Result Value Ref Range Sodium 140 136 - 145 mmol/L Potassium 3.9 3.5 - 5.1 mmol/L Chloride 108 (H) 98 - 107 mmol/L  
 CO2 28 21 - 32 mmol/L Anion gap 4 (L) 7 - 16 mmol/L Glucose 80 65 - 100 mg/dL BUN 24 (H) 8 - 23 MG/DL Creatinine 1.46 0.8 - 1.5 MG/DL  
 GFR est AA 59 (L) >60 ml/min/1.73m2 GFR est non-AA 48 (L) >60 ml/min/1.73m2 Calcium 8.4 8.3 - 10.4 MG/DL  
CBC WITH AUTOMATED DIFF Collection Time: 03/31/19  3:19 AM  
Result Value Ref Range WBC 8.4 4.3 - 11.1 K/uL  
 RBC 3.53 (L) 4.23 - 5.6 M/uL  
 HGB 11.7 (L) 13.6 - 17.2 g/dL HCT 36.1 (L) 41.1 - 50.3 % .3 (H) 79.6 - 97.8 FL  
 MCH 33.1 (H) 26.1 - 32.9 PG  
 MCHC 32.4 31.4 - 35.0 g/dL  
 RDW 12.5 11.9 - 14.6 % PLATELET 402 795 - 068 K/uL MPV 10.0 9.4 - 12.3 FL ABSOLUTE NRBC 0.00 0.0 - 0.2 K/uL  
 DF AUTOMATED NEUTROPHILS 63 43 - 78 % LYMPHOCYTES 17 13 - 44 % MONOCYTES 10 4.0 - 12.0 % EOSINOPHILS 9 (H) 0.5 - 7.8 % BASOPHILS 1 0.0 - 2.0 % IMMATURE GRANULOCYTES 0 0.0 - 5.0 %  
 ABS. NEUTROPHILS 5.3 1.7 - 8.2 K/UL  
 ABS. LYMPHOCYTES 1.5 0.5 - 4.6 K/UL  
 ABS. MONOCYTES 0.9 0.1 - 1.3 K/UL  
 ABS. EOSINOPHILS 0.7 0.0 - 0.8 K/UL  
 ABS. BASOPHILS 0.1 0.0 - 0.2 K/UL  
 ABS. IMM. GRANS. 0.0 0.0 - 0.5 K/UL All Micro Results Procedure Component Value Units Date/Time CULTURE, BLOOD [[id number]] Collected:  03/29/19 1912 Order Status:  Completed Specimen:  Blood Updated:  03/31/19 4824 Special Requests: --     
  RIGHT Antecubital 
  
  Culture result: NO GROWTH 2 DAYS     
 CULTURE, BLOOD [[id number]] Collected:  03/29/19 1912 Order Status:  Completed Specimen:  Blood Updated:  03/31/19 3061 Special Requests: --     
  LEFT 
FOREARM Culture result: NO GROWTH 2 DAYS INFLUENZA A & B AG (RAPID TEST) [[id number]] Collected:  03/29/19 2007 Order Status:  Completed Specimen:  Nasopharyngeal from Nasal washing Updated:  03/29/19 2036 Influenza A Ag NEGATIVE Comment: NEGATIVE FOR THE PRESENCE OF INFLUENZA A ANTIGEN 
INFECTION DUE TO INFLUENZA A CANNOT BE RULED OUT. BECAUSE THE ANTIGEN PRESENT IN THE SAMPLE MAY BE BELOW 
THE DETECTION LIMIT OF THE TEST. A NEGATIVE TEST IS PRESUMPTIVE AND IT IS RECOMMENDED THAT THESE RESULTS BE CONFIRMED BY VIRAL CULTURE OR AN FDA-CLEARED INFLUENZA A AND B MOLECULAR ASSAY. Influenza B Ag NEGATIVE Comment: NEGATIVE FOR THE PRESENCE OF INFLUENZA B ANTIGEN 
INFECTION DUE TO INFLUENZA B CANNOT BE RULED OUT. BECAUSE THE ANTIGEN PRESENT IN THE SAMPLE MAY BE BELOW 
THE DETECTION LIMIT OF THE TEST. A NEGATIVE TEST IS PRESUMPTIVE AND IT IS RECOMMENDED THAT THESE RESULTS BE CONFIRMED BY VIRAL CULTURE OR AN FDA-CLEARED INFLUENZA A AND B MOLECULAR ASSAY. Source NASOPHARYNGEAL Current Meds: 
Current Facility-Administered Medications Medication Dose Route Frequency  levoFLOXacin (LEVAQUIN) tablet 500 mg  500 mg Oral Q48H  
 escitalopram oxalate (LEXAPRO) tablet 10 mg  10 mg Oral DAILY  levothyroxine (SYNTHROID) tablet 75 mcg  75 mcg Oral ACB  rivastigmine (EXELON) 13.3 mg/24 hour patch 1 Patch  1 Patch TransDERmal DAILY  valproic acid (as sodium salt) (DEPAKENE) 250 mg/5 mL (5 mL) oral solution 250 mg  250 mg Oral Q12H  
 ARIPiprazole (ABILIFY) tablet 12 mg  12 mg Oral Q12H  
 [START ON 4/1/2019] ergocalciferol capsule 50,000 Units  50,000 Units Oral every Monday  tamsulosin (FLOMAX) capsule 0.4 mg  0.4 mg Oral DAILY  furosemide (LASIX) tablet 20 mg  20 mg Oral DAILY  memantine (NAMENDA) tablet 5 mg  5 mg Oral Q12H  
 amLODIPine (NORVASC) tablet 5 mg  5 mg Oral DAILY  aspirin delayed-release tablet 81 mg  81 mg Oral DAILY  losartan (COZAAR) tablet 100 mg  100 mg Oral DAILY  oxybutynin chloride XL (DITROPAN XL) tablet 5 mg  5 mg Oral QHS  pravastatin (PRAVACHOL) tablet 20 mg  20 mg Oral QHS  calcium-vitamin D (OS-JOANN) 500 mg-200 unit tablet  1 Tab Oral BID WITH MEALS  docusate sodium (COLACE) capsule 100 mg  100 mg Oral BID  ferrous sulfate tablet 325 mg  1 Tab Oral DAILY WITH BREAKFAST  metoprolol succinate (TOPROL-XL) XL tablet 12.5 mg  12.5 mg Oral DAILY  cyanocobalamin tablet 1,000 mcg  1,000 mcg Oral DAILY  folic acid-vit P6-QDL F53 (FOLTX) 2.5-25-2 mg tablet 1 Tab  1 Tab Oral DAILY  traMADol (ULTRAM) tablet 50 mg  50 mg Oral Q6H PRN  
 albuterol (PROVENTIL VENTOLIN) nebulizer solution 2.5 mg  2.5 mg Nebulization Q4H PRN  
 sodium chloride (NS) flush 5-40 mL  5-40 mL IntraVENous Q8H  
 sodium chloride (NS) flush 5-40 mL  5-40 mL IntraVENous PRN  
 acetaminophen (TYLENOL) tablet 650 mg  650 mg Oral Q4H PRN  
 ondansetron (ZOFRAN) injection 4 mg  4 mg IntraVENous Q4H PRN  
 enoxaparin (LOVENOX) injection 40 mg  40 mg SubCUTAneous Q24H Diet: DIET CARDIAC Other Studies (last 24 hours): No results found. Assessment and Plan:  
 
Hospital Problems as of 3/31/2019 Date Reviewed: 9/17/2018 Codes Class Noted - Resolved POA * (Principal) Fever ICD-10-CM: R50.9 ICD-9-CM: 780.60  3/29/2019 - Present Unknown Hypertension ICD-10-CM: I10 
ICD-9-CM: 401.9  Unknown - Present Yes  
   
 CHF (congestive heart failure) (HCC) ICD-10-CM: I50.9 ICD-9-CM: 428.0  Unknown - Present Yes Hypothyroidism ICD-10-CM: E03.9 ICD-9-CM: 244.9  Unknown - Present Yes Diabetes ([facility] ICD-10-CM: E11.9 ICD-9-CM: 250.00  Unknown - Present Yes Delirium ICD-10-CM: R41.0 ICD-9-CM: 780.09  11/12/2016 - Present Yes A/P:   
Fever/Delirium 
- Unclear source - CXR is unremarkable - UA is normal 
- Flu is negative - No obvious source of infection at this time - Will continue Tylenol and IV abx as blood cultures NGTD 
- Pt now reporting RLQ pain per staff member who is bilingual - will get CT abd/pelvis with contrast 
  
CHF 
- Not currently in exacerbation 
- Continue home meds 
  
DM2 
- Home meds 
- SSI 
  
Hypothyroidism 
- Home meds 
- TSH 3.310 
  
Alzheimer's Dementia - Patient lives [address] very pleasantly confused - Daughter reports his confusion is slightly worse than baseline, but better than it was on presentation  
  
DC planning/Dispo:  Return to NH 
DVT ppx:  Lovenox Code status: DNR Medical decision maker: No MPOA on file,  Daughter is emergency contact Case reviewed with supervising physician - YVETTE Vazquez MD 
 
Signed: 
JAYME Gilmore

## 2019-03-31 NOTE — PROGRESS NOTES
Nutrition Reason for assessment: Referral received from nursing admission Malnutrition Screening Tool Recently Lost Weight Without Trying: Yes If Yes, How Much Weight Loss: Unsure Eating Poorly Due to Decreased Appetite: No  
BPA for EN/TPN PTA Assessment:  
Diet: DIET CARDIAC Regular DIET NUTRITIONAL SUPPLEMENTS Breakfast, Lunch, Dinner; [employer] Food/Nutrition Patient History:  The pt is Israeli speaking with a h/o dementia. No family at bedside and he is pleasantly confused. There is no evidence in EMR of any prior EN or TPN. Per RN, pt requires assistance with meals and was fed this morning by hospital staff and this afternoon by his daughter. RN states that he ate well. H/O CHF, CKD, and DM. He is from [employer]. AM BMP glucose of 80 mg/dl. Anthropometrics:Height: 5' 5\" (165.1 cm),  Weight: 69.1 kg (152 lb 6.4 oz), Weight Source: Bed, Body mass index is 25.36 kg/m². BMI class of normal weight. No edema noted. WT / BMI 3/31/2019 9/20/2018 10/27/2017 7/27/2017 11/13/2016 WEIGHT 152 lb 6.4 oz 159 lb 9.8 oz 165 lb 179 lb 164 lb 3.2 oz Per weights listed in EMR, potential for a 7 pound, 4.4% clinically insignificant weight loss over ~6 months. Macronutrient needs: EER:  6922-7866 kcal /day (20-25 kcal/kg listed BW) EPR:  55-69 grams protein/day (0.8-1 grams/kg listed BW)(GFR 48)-CKD Intake/Comparative Standards:Average intake for past 3 day(s)/4 recorded meal(s): 85%. This potentially meets ~100% of kcal and ~100% of protein needs Nutrition Diagnosis: No nutrition diagnosis. Intervention: 
Meals and snacks: Continue current diet. Nutrition Supplement Therapy: d/c ensure enlive Discharge nutrition plan: Too soon to determine. Coordination of Nutrition Care: Livan Dickinson 87, [address] [place] [address], [address] [place] [address], [phone]

## 2019-03-31 NOTE — ROUTINE PROCESS
available onsite for any request from 8:30AM-12:00PM. Celia Martinez CHI//  Patient Relations & Interpreting Services 
p: [phone] / [email or web address]

## 2019-03-31 NOTE — PROGRESS NOTES
Patient complains of burning sensation in the RLQ area. Upon assessment patient's bowel sounds are hyper active no tenderness noted.   Notified NIRALI Mcnulty.

## 2019-03-31 NOTE — PROGRESS NOTES
RN to inform ordering physician GFR is 48, patient needs IV hydration or to be done without, RN to call and let me know

## 2019-03-31 NOTE — PROGRESS NOTES
present during RN assessment and medications. Kael Savage CH[facility]//  Patient Relations & Interpreting Services 
p: [phone] / [email or web address]

## 2019-03-31 NOTE — PROGRESS NOTES
Hourly rounding completed on this shift. All needs met. PRN pain medication given to manage pain. Respiratory gave PRN breathing tx for complaint of SOB. Interpretor phone used multiple times during this shift. Pt is confused and only oriented to person but was able to voice complaints of SOB and generalized pain. Pt pleasant throughout shift but did remove oxygen multiple times. Used interpretor services to educate pt during this shift of importance of oxygen pt did not voice understanding. Pt is currently resting in bed. Will continue to monitor and give report to oncoming nurse.

## 2019-04-01 LAB
ANION GAP SERPL CALC-SCNC: 7 MMOL/L (ref 7–16)
BASOPHILS # BLD: 0 K/UL (ref 0–0.2)
BASOPHILS NFR BLD: 1 % (ref 0–2)
BUN SERPL-MCNC: 23 MG/DL (ref 8–23)
CALCIUM SERPL-MCNC: 8.6 MG/DL (ref 8.3–10.4)
CHLORIDE SERPL-SCNC: 107 MMOL/L (ref 98–107)
CO2 SERPL-SCNC: 28 MMOL/L (ref 21–32)
CREAT SERPL-MCNC: 1.24 MG/DL (ref 0.8–1.5)
DIFFERENTIAL METHOD BLD: ABNORMAL
EOSINOPHIL # BLD: 0.7 K/UL (ref 0–0.8)
EOSINOPHIL NFR BLD: 10 % (ref 0.5–7.8)
ERYTHROCYTE [DISTWIDTH] IN BLOOD BY AUTOMATED COUNT: 12.6 % (ref 11.9–14.6)
GLUCOSE BLD STRIP.AUTO-MCNC: 107 MG/DL (ref 65–100)
GLUCOSE SERPL-MCNC: 76 MG/DL (ref 65–100)
HCT VFR BLD AUTO: 38.1 % (ref 41.1–50.3)
HGB BLD-MCNC: 11.8 G/DL (ref 13.6–17.2)
IMM GRANULOCYTES # BLD AUTO: 0 K/UL (ref 0–0.5)
IMM GRANULOCYTES NFR BLD AUTO: 0 % (ref 0–5)
LYMPHOCYTES # BLD: 1.7 K/UL (ref 0.5–4.6)
LYMPHOCYTES NFR BLD: 25 % (ref 13–44)
MCH RBC QN AUTO: 32.8 PG (ref 26.1–32.9)
MCHC RBC AUTO-ENTMCNC: 31 G/DL (ref 31.4–35)
MCV RBC AUTO: 105.8 FL (ref 79.6–97.8)
MONOCYTES # BLD: 0.9 K/UL (ref 0.1–1.3)
MONOCYTES NFR BLD: 13 % (ref 4–12)
NEUTS SEG # BLD: 3.5 K/UL (ref 1.7–8.2)
NEUTS SEG NFR BLD: 51 % (ref 43–78)
NRBC # BLD: 0 K/UL (ref 0–0.2)
PLATELET # BLD AUTO: 156 K/UL (ref 150–450)
PMV BLD AUTO: 10.3 FL (ref 9.4–12.3)
POTASSIUM SERPL-SCNC: 4.7 MMOL/L (ref 3.5–5.1)
RBC # BLD AUTO: 3.6 M/UL (ref 4.23–5.6)
SODIUM SERPL-SCNC: 142 MMOL/L (ref 136–145)
WBC # BLD AUTO: 6.8 K/UL (ref 4.3–11.1)

## 2019-04-01 PROCEDURE — 74011250637 HC RX REV CODE- 250/637: Performed by: FAMILY MEDICINE

## 2019-04-01 PROCEDURE — 77010033678 HC OXYGEN DAILY

## 2019-04-01 PROCEDURE — 80048 BASIC METABOLIC PNL TOTAL CA: CPT

## 2019-04-01 PROCEDURE — 74011250636 HC RX REV CODE- 250/636: Performed by: FAMILY MEDICINE

## 2019-04-01 PROCEDURE — 82962 GLUCOSE BLOOD TEST: CPT

## 2019-04-01 PROCEDURE — 99218 HC RM OBSERVATION: CPT

## 2019-04-01 PROCEDURE — 74011250636 HC RX REV CODE- 250/636: Performed by: NURSE PRACTITIONER

## 2019-04-01 PROCEDURE — 85025 COMPLETE CBC W/AUTO DIFF WBC: CPT

## 2019-04-01 PROCEDURE — 36415 COLL VENOUS BLD VENIPUNCTURE: CPT

## 2019-04-01 PROCEDURE — 94760 N-INVAS EAR/PLS OXIMETRY 1: CPT

## 2019-04-01 PROCEDURE — 94761 N-INVAS EAR/PLS OXIMETRY MLT: CPT

## 2019-04-01 PROCEDURE — 65270000029 HC RM PRIVATE

## 2019-04-01 RX ORDER — LEVOFLOXACIN 500 MG/1
500 TABLET, FILM COATED ORAL
Status: DISCONTINUED | OUTPATIENT
Start: 2019-04-02 | End: 2019-04-05 | Stop reason: HOSPADM

## 2019-04-01 RX ORDER — FUROSEMIDE 10 MG/ML
40 INJECTION INTRAMUSCULAR; INTRAVENOUS ONCE
Status: COMPLETED | OUTPATIENT
Start: 2019-04-01 | End: 2019-04-01

## 2019-04-01 RX ADMIN — DOCUSATE SODIUM 100 MG: 100 CAPSULE, LIQUID FILLED ORAL at 09:56

## 2019-04-01 RX ADMIN — DOCUSATE SODIUM 100 MG: 100 CAPSULE, LIQUID FILLED ORAL at 18:06

## 2019-04-01 RX ADMIN — VALPROIC ACID 250 MG: 250 SOLUTION ORAL at 21:37

## 2019-04-01 RX ADMIN — FUROSEMIDE 20 MG: 20 TABLET ORAL at 10:05

## 2019-04-01 RX ADMIN — ARIPIPRAZOLE 12 MG: 10 TABLET ORAL at 21:37

## 2019-04-01 RX ADMIN — Medication 10 ML: at 14:50

## 2019-04-01 RX ADMIN — ASPIRIN 81 MG: 81 TABLET, COATED ORAL at 09:58

## 2019-04-01 RX ADMIN — Medication 10 ML: at 05:02

## 2019-04-01 RX ADMIN — AMLODIPINE BESYLATE 5 MG: 5 TABLET ORAL at 09:56

## 2019-04-01 RX ADMIN — Medication 10 ML: at 21:38

## 2019-04-01 RX ADMIN — LOSARTAN POTASSIUM 100 MG: 50 TABLET ORAL at 09:57

## 2019-04-01 RX ADMIN — MEMANTINE HYDROCHLORIDE 5 MG: 5 TABLET ORAL at 21:37

## 2019-04-01 RX ADMIN — ENOXAPARIN SODIUM 40 MG: 40 INJECTION SUBCUTANEOUS at 22:34

## 2019-04-01 RX ADMIN — Medication 1 TABLET: at 10:03

## 2019-04-01 RX ADMIN — CALCIUM CARBONATE 500 MG (1,250 MG)-VITAMIN D3 200 UNIT TABLET 1 TABLET: at 09:56

## 2019-04-01 RX ADMIN — PRAVASTATIN SODIUM 20 MG: 20 TABLET ORAL at 21:37

## 2019-04-01 RX ADMIN — MEMANTINE HYDROCHLORIDE 5 MG: 5 TABLET ORAL at 09:56

## 2019-04-01 RX ADMIN — ESCITALOPRAM OXALATE 10 MG: 10 TABLET ORAL at 09:57

## 2019-04-01 RX ADMIN — ARIPIPRAZOLE 12 MG: 10 TABLET ORAL at 10:04

## 2019-04-01 RX ADMIN — TAMSULOSIN HYDROCHLORIDE 0.4 MG: 0.4 CAPSULE ORAL at 09:56

## 2019-04-01 RX ADMIN — ERGOCALCIFEROL 50000 UNITS: 1.25 CAPSULE ORAL at 10:30

## 2019-04-01 RX ADMIN — LEVOTHYROXINE SODIUM 75 MCG: 75 TABLET ORAL at 05:02

## 2019-04-01 RX ADMIN — OXYBUTYNIN CHLORIDE 5 MG: 5 TABLET, EXTENDED RELEASE ORAL at 21:37

## 2019-04-01 RX ADMIN — FUROSEMIDE 40 MG: 10 INJECTION, SOLUTION INTRAMUSCULAR; INTRAVENOUS at 18:06

## 2019-04-01 RX ADMIN — CALCIUM CARBONATE 500 MG (1,250 MG)-VITAMIN D3 200 UNIT TABLET 1 TABLET: at 17:00

## 2019-04-01 RX ADMIN — FERROUS SULFATE TAB 325 MG (65 MG ELEMENTAL FE) 325 MG: 325 (65 FE) TAB at 09:56

## 2019-04-01 RX ADMIN — CYANOCOBALAMIN TAB 1000 MCG 1000 MCG: 1000 TAB at 09:58

## 2019-04-01 RX ADMIN — VALPROIC ACID 250 MG: 250 SOLUTION ORAL at 10:01

## 2019-04-01 RX ADMIN — METOPROLOL SUCCINATE 12.5 MG: 25 TABLET, EXTENDED RELEASE ORAL at 09:56

## 2019-04-01 NOTE — PROGRESS NOTES
available on-site from 4:30 p.m. - 1:00 a.m. Please call [phone] with any interpreting requests. Thank you, SHAD Morales / 
Margarito Azeri Patient Relations & Interpreting Services 
c: [phone] / [email or web address] / [place], [address] [facility] 
[email or web address]

## 2019-04-01 NOTE — PROGRESS NOTES
Interdisciplinary Rounds completed 04/01/19. Nursing, Case Management, Physician and PT present. Plan of care reviewed and updated.

## 2019-04-01 NOTE — PROGRESS NOTES
Interpreting services available from 6:00am-4:30pm. Please call [facility]  at [phone]. Scott Arndt Patient Relations & Interpreting Services 
c: [phone] / [email or web address] Fiona Newman Do [facility] [address] / [place], [address] [facility] 
[email or web address]

## 2019-04-01 NOTE — PROGRESS NOTES
Problem: Falls - Risk of 
Goal: *Absence of Falls Description Document Blondie Hover Fall Risk and appropriate interventions in the flowsheet. Outcome: Progressing Towards Goal 
  
Problem: Patient Education: Go to Patient Education Activity Goal: Patient/Family Education Outcome: Progressing Towards Goal 
  
Problem: Pressure Injury - Risk of 
Goal: *Prevention of pressure injury Description Document Bebo Scale and appropriate interventions in the flowsheet. Outcome: Progressing Towards Goal 
  
Problem: Communication Impaired (Adult) Goal: *Speech Goal: (INSERT TEXT) Description Lao speaking Outcome: Progressing Towards Goal 
  
Problem: Nutrition Deficit Goal: *Optimize nutritional status Outcome: Progressing Towards Goal

## 2019-04-01 NOTE — PROGRESS NOTES
Patient was admitted to hospital from [facility] rehab. Patient has a long term bed at facility. Patient can return once medically stable. Care Management Interventions PCP Verified by CM: Yes Mode of Transport at Discharge: BLS Transition of Care Consult (CM Consult): Discharge Planning, [facility] Discharge Durable Medical Equipment: No 
Current Support Network: [address] [place] [address] Confirm Follow Up Transport: Other (see comment) Plan discussed with Pt/Family/Caregiver: Yes Freedom of Choice Offered: Yes Discharge Location Discharge Placement: [facility]

## 2019-04-01 NOTE — PROGRESS NOTES
phone used this shift. Upon assessment, medication administration, and to communicate information to family members. PT/OT consulted today. Patient walks at assissted living but with a sitter that stays with him 24 hours of the day per daughter. Patient ate all meals this shift. Hourly rounds performed this shift. Bed lowered and locked, side rails x2, and call light in reach. All patient needs are met at this time. Will continue to monitor and give bedside shift report to oncoming nurse.

## 2019-04-01 NOTE — PHYSICIAN ADVISORY
Letter of Determination: Upgrade from Observation to Inpatient Status This patient was originally hospitalized as Outpatient Status with Observation Services on 3/30/2019 for fever. This patient now meets for Inpatient Admission in accordance with CMS regulation Section 43 .3. Specifically, patient's stay is now over Two Midnights and was medically necessary. The patient's stay was medically necessary based on extreme advanced age, vital signs significant for temperature of 103.3 degrees farenheit, oxygen saturation of 88% on room air, and respiratory rate of 33 breaths per minute. Consistent with CMS guidelines, patient meets for inpatient status. It is our recommendation that this patient's hospitalization status should be upgraded from OBSERVATION to INPATIENT status.  
  
The final decision regarding the patient's hospitalization status depends on the attending physician's judgement. Esperanza Carlin MD, BROWN, Physician Advisor [address] [place] [address].

## 2019-04-01 NOTE — PROGRESS NOTES
Hourly rounding completed on this shift. No new complaints at this time. All needs met. Daily weight today is 153.4. Interpretor phone used to communicate with pt during this shift. Pt is currently resting in bed. Will continue to monitor and give report to oncoming nurse.

## 2019-04-01 NOTE — PROGRESS NOTES
Hospitalist Progress Note Admit Date:  3/29/2019  6:44 PM  
Name:  Sherie Louie Age:  80 y.o. 
:  3/19/1931 MRN:  [id number] PCP:  Unknown, Provider Treatment Team: Attending Provider: Flo Stokes MD; Utilization Review: Eliza Rodríguez RN; Care Manager: Corey Anthony RN Subjective:  
CC: fever, hypoxia, increased confusions Pt is an 80fyo male with PMH of AD, CKD, CHF. Pt presented via EMS from nursing facility with fever, hypoxia, worsening confusion. Pt daughter reported wheezing but this has resolved. Temp in .3, no clear source. WBC normal, chemistry unremarkable. Urine neg, blood cultures in process - NGTD. CXR neg. IV vanc and zosyn. IVF. Pt afebrile but is indicating pain in his RLQ via staff member who speaks Slovenian. CT abd pelvis identified bilateral pleural effusions. Also noted atherosclerosis and lumbar compression fx. Lengthy discussion with pt's daughter and grand daughter re pt status, dx, plan of care. Will try some lasix to resolve the effusions. Pt is not SOB or tachypneic. Pt seems to be pretty close to baseline, anticipate d/c 1-2 days back to [facility]. Objective:  
 
Patient Vitals for the past 24 hrs: 
 Temp Pulse Resp BP SpO2  
19 1048 98.2 °F (36.8 °C) 66 19 130/66 95 % 19 0816     94 % 19 0814     (!) 88 % 19 0756 98 °F (36.7 °C) 61 20 139/65 95 % 19 0305 97.9 °F (36.6 °C) 60 20 136/67 94 % 19 2339 98 °F (36.7 °C) 66 20 122/53 94 % 19 1936 98.5 °F (36.9 °C) 66 20 130/64 95 % Oxygen Therapy O2 Sat (%): 95 % (19 1048) Pulse via Oximetry: 60 beats per minute (19 0816) O2 Device: Nasal cannula (19) O2 Flow Rate (L/min): 5 l/min (19 0816) Intake/Output Summary (Last 24 hours) at 2019 1651 Last data filed at 2019 1300 Gross per 24 hour Intake 360 ml Output  Net 360 ml  
   
 
 REVIEW OF SYSTEMS: Comprehensive ROS performed and negative except as stated in HPI. Physical Examination: 
General:    Well nourished. Awake and alert. Oriented x 1 Head:  Normocephalic, atraumatic Eyes:  Extraocular movements intact CV:   RRR. No  Murmurs, clicks, or gallops Lungs:   Unlabored, no cyanosis Abdomen:   Soft, nondistended, indicates pain RLQc Extremities: Warm and dry. No cyanosis or edema. Skin:     No rashes or jaundice. Neuro:  No gross focal deficits Psych:  Mood and affect appropriate Data Review: 
I have reviewed all labs, meds, telemetry events, and studies from the last 24 hours. Recent Results (from the past 24 hour(s)) METABOLIC PANEL, BASIC Collection Time: 04/01/19  6:38 AM  
Result Value Ref Range Sodium 142 136 - 145 mmol/L Potassium 4.7 3.5 - 5.1 mmol/L Chloride 107 98 - 107 mmol/L  
 CO2 28 21 - 32 mmol/L Anion gap 7 7 - 16 mmol/L Glucose 76 65 - 100 mg/dL BUN 23 8 - 23 MG/DL Creatinine 1.24 0.8 - 1.5 MG/DL  
 GFR est AA >60 >60 ml/min/1.73m2 GFR est non-AA 58 (L) >60 ml/min/1.73m2 Calcium 8.6 8.3 - 10.4 MG/DL  
CBC WITH AUTOMATED DIFF Collection Time: 04/01/19  6:38 AM  
Result Value Ref Range WBC 6.8 4.3 - 11.1 K/uL  
 RBC 3.60 (L) 4.23 - 5.6 M/uL  
 HGB 11.8 (L) 13.6 - 17.2 g/dL HCT 38.1 (L) 41.1 - 50.3 % .8 (H) 79.6 - 97.8 FL  
 MCH 32.8 26.1 - 32.9 PG  
 MCHC 31.0 (L) 31.4 - 35.0 g/dL  
 RDW 12.6 11.9 - 14.6 % PLATELET 178 760 - 653 K/uL MPV 10.3 9.4 - 12.3 FL ABSOLUTE NRBC 0.00 0.0 - 0.2 K/uL  
 DF AUTOMATED NEUTROPHILS 51 43 - 78 % LYMPHOCYTES 25 13 - 44 % MONOCYTES 13 (H) 4.0 - 12.0 % EOSINOPHILS 10 (H) 0.5 - 7.8 % BASOPHILS 1 0.0 - 2.0 % IMMATURE GRANULOCYTES 0 0.0 - 5.0 %  
 ABS. NEUTROPHILS 3.5 1.7 - 8.2 K/UL  
 ABS. LYMPHOCYTES 1.7 0.5 - 4.6 K/UL  
 ABS. MONOCYTES 0.9 0.1 - 1.3 K/UL  
 ABS. EOSINOPHILS 0.7 0.0 - 0.8 K/UL  
 ABS. BASOPHILS 0.0 0.0 - 0.2 K/UL ABS. IMM. GRANS. 0.0 0.0 - 0.5 K/UL All Micro Results Procedure Component Value Units Date/Time CULTURE, BLOOD [[id number]] Collected:  03/29/19 1912 Order Status:  Completed Specimen:  Blood Updated:  04/01/19 7866 Special Requests: --     
  RIGHT Antecubital 
  
  Culture result: NO GROWTH 3 DAYS     
 CULTURE, BLOOD [[id number]] Collected:  03/29/19 1912 Order Status:  Completed Specimen:  Blood Updated:  04/01/19 0347 Special Requests: --     
  LEFT 
FOREARM Culture result: NO GROWTH 3 DAYS INFLUENZA A & B AG (RAPID TEST) [[id number]] Collected:  03/29/19 2007 Order Status:  Completed Specimen:  Nasopharyngeal from Nasal washing Updated:  03/29/19 2036 Influenza A Ag NEGATIVE Comment: NEGATIVE FOR THE PRESENCE OF INFLUENZA A ANTIGEN 
INFECTION DUE TO INFLUENZA A CANNOT BE RULED OUT. BECAUSE THE ANTIGEN PRESENT IN THE SAMPLE MAY BE BELOW 
THE DETECTION LIMIT OF THE TEST. A NEGATIVE TEST IS PRESUMPTIVE AND IT IS RECOMMENDED THAT THESE RESULTS BE CONFIRMED BY VIRAL CULTURE OR AN FDA-CLEARED INFLUENZA A AND B MOLECULAR ASSAY. Influenza B Ag NEGATIVE Comment: NEGATIVE FOR THE PRESENCE OF INFLUENZA B ANTIGEN 
INFECTION DUE TO INFLUENZA B CANNOT BE RULED OUT. BECAUSE THE ANTIGEN PRESENT IN THE SAMPLE MAY BE BELOW 
THE DETECTION LIMIT OF THE TEST. A NEGATIVE TEST IS PRESUMPTIVE AND IT IS RECOMMENDED THAT THESE RESULTS BE CONFIRMED BY VIRAL CULTURE OR AN FDA-CLEARED INFLUENZA A AND B MOLECULAR ASSAY. Source NASOPHARYNGEAL Current Meds: 
Current Facility-Administered Medications Medication Dose Route Frequency  furosemide (LASIX) injection 40 mg  40 mg IntraVENous ONCE  
 levoFLOXacin (LEVAQUIN) tablet 500 mg  500 mg Oral Q48H  
 escitalopram oxalate (LEXAPRO) tablet 10 mg  10 mg Oral DAILY  levothyroxine (SYNTHROID) tablet 75 mcg  75 mcg Oral ACB  rivastigmine (EXELON) 13.3 mg/24 hour patch 1 Patch  1 Patch TransDERmal DAILY  valproic acid (as sodium salt) (DEPAKENE) 250 mg/5 mL (5 mL) oral solution 250 mg  250 mg Oral Q12H  
 ARIPiprazole (ABILIFY) tablet 12 mg  12 mg Oral Q12H  ergocalciferol capsule 50,000 Units  50,000 Units Oral every Monday  tamsulosin (FLOMAX) capsule 0.4 mg  0.4 mg Oral DAILY  furosemide (LASIX) tablet 20 mg  20 mg Oral DAILY  memantine (NAMENDA) tablet 5 mg  5 mg Oral Q12H  
 amLODIPine (NORVASC) tablet 5 mg  5 mg Oral DAILY  aspirin delayed-release tablet 81 mg  81 mg Oral DAILY  losartan (COZAAR) tablet 100 mg  100 mg Oral DAILY  oxybutynin chloride XL (DITROPAN XL) tablet 5 mg  5 mg Oral QHS  pravastatin (PRAVACHOL) tablet 20 mg  20 mg Oral QHS  calcium-vitamin D (OS-JOANN) 500 mg-200 unit tablet  1 Tab Oral BID WITH MEALS  docusate sodium (COLACE) capsule 100 mg  100 mg Oral BID  ferrous sulfate tablet 325 mg  1 Tab Oral DAILY WITH BREAKFAST  metoprolol succinate (TOPROL-XL) XL tablet 12.5 mg  12.5 mg Oral DAILY  cyanocobalamin tablet 1,000 mcg  1,000 mcg Oral DAILY  folic acid-vit E0-THM L95 (FOLTX) 2.5-25-2 mg tablet 1 Tab  1 Tab Oral DAILY  traMADol (ULTRAM) tablet 50 mg  50 mg Oral Q6H PRN  
 albuterol (PROVENTIL VENTOLIN) nebulizer solution 2.5 mg  2.5 mg Nebulization Q4H PRN  
 sodium chloride (NS) flush 5-40 mL  5-40 mL IntraVENous Q8H  
 sodium chloride (NS) flush 5-40 mL  5-40 mL IntraVENous PRN  
 acetaminophen (TYLENOL) tablet 650 mg  650 mg Oral Q4H PRN  
 ondansetron (ZOFRAN) injection 4 mg  4 mg IntraVENous Q4H PRN  
 enoxaparin (LOVENOX) injection 40 mg  40 mg SubCUTAneous Q24H Diet: DIET CARDIAC Other Studies (last 24 hours): 
Ct Abd Pelv Wo Cont Result Date: 3/31/2019 CT abdomen and pelvis without contrast  History: RLQ pain, sepsis unknown source.  Technique: Helically acquired images were obtained from the upper abdomen to the ischial tuberosities reconstructed at 5mm intervals after oral contrast only. Intravenous contrast was not administered. Coronal reformatted images were submitted. Radiation dose reduction techniques were used for this study:  Our CT scanners use one or all of the following: Automated exposure control, adjustment of the mA and/or kVp according to patient's size, iterative reconstruction. Comparison: None CT ABDOMEN: There are partially imaged small pleural effusions and adjacent compressive atelectasis, right greater than left. There is an 8 mm cyst within the left hepatic lobe. The patient status post cholecystectomy. The spleen, pancreas, adrenal glands and kidneys are unremarkable on this noncontrast study. Extensive atherosclerotic changes are present involving the abdominal aorta and iliac vessels. No adenopathy or ascites is present. The appendix is unremarkable. There is a mildly excessive amount of fecal material within the colon. CT PELVIS: No adenopathy or ascites is present. There are no inflammatory changes. There is a small right inguinal hernia containing fat. There is streak artifact from right hip arthroplasty. There is a subacute appearing compression fracture with moderate height loss at L1. Remote lumbar compression fractures are present at several additional levels. IMPRESSION: 1. Bilateral pleural effusions, right greater than left. 2. Extensive atherosclerosis. 3. Subacute L1 compression fracture. Assessment and Plan:  
 
Hospital Problems as of 4/1/2019 Date Reviewed: 9/17/2018 Codes Class Noted - Resolved POA * (Principal) Fever ICD-10-CM: R50.9 ICD-9-CM: 780.60  3/29/2019 - Present Unknown Hypertension ICD-10-CM: I10 
ICD-9-CM: 401.9  Unknown - Present Yes  
   
 CHF (congestive heart failure) (HCC) ICD-10-CM: I50.9 ICD-9-CM: 428.0  Unknown - Present Yes Hypothyroidism ICD-10-CM: E03.9 ICD-9-CM: 244.9  Unknown - Present Yes Diabetes ([facility] [address] ICD-10-CM: E11.9 ICD-9-CM: 250.00  Unknown - Present Yes Delirium ICD-10-CM: R41.0 ICD-9-CM: 780.09  11/12/2016 - Present Yes A/P:   
Fever/Delirium 
- Unclear source - CXR is unremarkable - UA is normal 
- Flu is negative - No obvious source of infection at this time - Will continue Tylenol and IV abx as blood cultures NGTD 
- CT abd/pelvis with contrast showed pulm effusions. 
  
CHF 
- Not currently in exacerbation 
- Continue home meds - Lasix 40 IV x 1 
  
DM2 
- Home meds 
- SSI 
  
Hypothyroidism 
- Home meds 
- TSH 3.310 
  
Alzheimer's Dementia - Patient lives in [address] very pleasantly confused - Daughter reports he is fairly close to his baseline 
  
DC planning/Dispo:  Return to NH, 1-2 days DVT ppx:  Lovenox Code status: DNR Medical decision maker: No MPOA on file,  Daughter is emergency contact Case reviewed with supervising physician - YVETTE Gonsalez MD 
 
Signed: 
Marylen Marseille, NP-C

## 2019-04-02 ENCOUNTER — APPOINTMENT (OUTPATIENT)
Dept: GENERAL RADIOLOGY | Age: 84
DRG: 871 | End: 2019-04-02
Attending: NURSE PRACTITIONER
Payer: MEDICARE

## 2019-04-02 LAB
ANION GAP SERPL CALC-SCNC: 6 MMOL/L (ref 7–16)
BASOPHILS # BLD: 0.1 K/UL (ref 0–0.2)
BASOPHILS NFR BLD: 1 % (ref 0–2)
BUN SERPL-MCNC: 25 MG/DL (ref 8–23)
CALCIUM SERPL-MCNC: 8.7 MG/DL (ref 8.3–10.4)
CHLORIDE SERPL-SCNC: 104 MMOL/L (ref 98–107)
CO2 SERPL-SCNC: 32 MMOL/L (ref 21–32)
CREAT SERPL-MCNC: 1.24 MG/DL (ref 0.8–1.5)
DIFFERENTIAL METHOD BLD: ABNORMAL
EOSINOPHIL # BLD: 0.4 K/UL (ref 0–0.8)
EOSINOPHIL NFR BLD: 6 % (ref 0.5–7.8)
ERYTHROCYTE [DISTWIDTH] IN BLOOD BY AUTOMATED COUNT: 12.3 % (ref 11.9–14.6)
GLUCOSE SERPL-MCNC: 82 MG/DL (ref 65–100)
HCT VFR BLD AUTO: 36.3 % (ref 41.1–50.3)
HGB BLD-MCNC: 11.6 G/DL (ref 13.6–17.2)
IMM GRANULOCYTES # BLD AUTO: 0 K/UL (ref 0–0.5)
IMM GRANULOCYTES NFR BLD AUTO: 0 % (ref 0–5)
LYMPHOCYTES # BLD: 1.2 K/UL (ref 0.5–4.6)
LYMPHOCYTES NFR BLD: 20 % (ref 13–44)
MCH RBC QN AUTO: 32.7 PG (ref 26.1–32.9)
MCHC RBC AUTO-ENTMCNC: 32 G/DL (ref 31.4–35)
MCV RBC AUTO: 102.3 FL (ref 79.6–97.8)
MONOCYTES # BLD: 0.8 K/UL (ref 0.1–1.3)
MONOCYTES NFR BLD: 13 % (ref 4–12)
NEUTS SEG # BLD: 3.7 K/UL (ref 1.7–8.2)
NEUTS SEG NFR BLD: 60 % (ref 43–78)
NRBC # BLD: 0 K/UL (ref 0–0.2)
PLATELET # BLD AUTO: 193 K/UL (ref 150–450)
PMV BLD AUTO: 9.5 FL (ref 9.4–12.3)
POTASSIUM SERPL-SCNC: 4.1 MMOL/L (ref 3.5–5.1)
RBC # BLD AUTO: 3.55 M/UL (ref 4.23–5.6)
SODIUM SERPL-SCNC: 142 MMOL/L (ref 136–145)
WBC # BLD AUTO: 6.1 K/UL (ref 4.3–11.1)

## 2019-04-02 PROCEDURE — 65270000029 HC RM PRIVATE

## 2019-04-02 PROCEDURE — 74011250637 HC RX REV CODE- 250/637: Performed by: FAMILY MEDICINE

## 2019-04-02 PROCEDURE — 74011250636 HC RX REV CODE- 250/636: Performed by: FAMILY MEDICINE

## 2019-04-02 PROCEDURE — 85025 COMPLETE CBC W/AUTO DIFF WBC: CPT

## 2019-04-02 PROCEDURE — 97161 PT EVAL LOW COMPLEX 20 MIN: CPT

## 2019-04-02 PROCEDURE — 71045 X-RAY EXAM CHEST 1 VIEW: CPT

## 2019-04-02 PROCEDURE — 94760 N-INVAS EAR/PLS OXIMETRY 1: CPT

## 2019-04-02 PROCEDURE — 74011250637 HC RX REV CODE- 250/637: Performed by: NURSE PRACTITIONER

## 2019-04-02 PROCEDURE — 97166 OT EVAL MOD COMPLEX 45 MIN: CPT

## 2019-04-02 PROCEDURE — 77010033678 HC OXYGEN DAILY

## 2019-04-02 PROCEDURE — 74011250636 HC RX REV CODE- 250/636: Performed by: INTERNAL MEDICINE

## 2019-04-02 PROCEDURE — 36415 COLL VENOUS BLD VENIPUNCTURE: CPT

## 2019-04-02 PROCEDURE — 80048 BASIC METABOLIC PNL TOTAL CA: CPT

## 2019-04-02 RX ORDER — FUROSEMIDE 10 MG/ML
40 INJECTION INTRAMUSCULAR; INTRAVENOUS ONCE
Status: COMPLETED | OUTPATIENT
Start: 2019-04-02 | End: 2019-04-02

## 2019-04-02 RX ADMIN — VALPROIC ACID 250 MG: 250 SOLUTION ORAL at 21:31

## 2019-04-02 RX ADMIN — Medication 1 TABLET: at 09:10

## 2019-04-02 RX ADMIN — FUROSEMIDE 40 MG: 10 INJECTION, SOLUTION INTRAMUSCULAR; INTRAVENOUS at 15:20

## 2019-04-02 RX ADMIN — LEVOTHYROXINE SODIUM 75 MCG: 75 TABLET ORAL at 05:05

## 2019-04-02 RX ADMIN — FUROSEMIDE 20 MG: 20 TABLET ORAL at 09:09

## 2019-04-02 RX ADMIN — Medication 10 ML: at 05:05

## 2019-04-02 RX ADMIN — LOSARTAN POTASSIUM 100 MG: 50 TABLET ORAL at 09:09

## 2019-04-02 RX ADMIN — LEVOFLOXACIN 500 MG: 500 TABLET, FILM COATED ORAL at 12:01

## 2019-04-02 RX ADMIN — ENOXAPARIN SODIUM 40 MG: 40 INJECTION SUBCUTANEOUS at 22:48

## 2019-04-02 RX ADMIN — DOCUSATE SODIUM 100 MG: 100 CAPSULE, LIQUID FILLED ORAL at 17:53

## 2019-04-02 RX ADMIN — FERROUS SULFATE TAB 325 MG (65 MG ELEMENTAL FE) 325 MG: 325 (65 FE) TAB at 09:10

## 2019-04-02 RX ADMIN — DOCUSATE SODIUM 100 MG: 100 CAPSULE, LIQUID FILLED ORAL at 09:10

## 2019-04-02 RX ADMIN — AMLODIPINE BESYLATE 5 MG: 5 TABLET ORAL at 09:09

## 2019-04-02 RX ADMIN — ARIPIPRAZOLE 12 MG: 10 TABLET ORAL at 21:31

## 2019-04-02 RX ADMIN — TAMSULOSIN HYDROCHLORIDE 0.4 MG: 0.4 CAPSULE ORAL at 09:09

## 2019-04-02 RX ADMIN — OXYBUTYNIN CHLORIDE 5 MG: 5 TABLET, EXTENDED RELEASE ORAL at 21:31

## 2019-04-02 RX ADMIN — CALCIUM CARBONATE 500 MG (1,250 MG)-VITAMIN D3 200 UNIT TABLET 1 TABLET: at 17:53

## 2019-04-02 RX ADMIN — VALPROIC ACID 250 MG: 250 SOLUTION ORAL at 09:09

## 2019-04-02 RX ADMIN — METOPROLOL SUCCINATE 12.5 MG: 25 TABLET, EXTENDED RELEASE ORAL at 09:09

## 2019-04-02 RX ADMIN — PRAVASTATIN SODIUM 20 MG: 20 TABLET ORAL at 21:31

## 2019-04-02 RX ADMIN — CYANOCOBALAMIN TAB 1000 MCG 1000 MCG: 1000 TAB at 09:09

## 2019-04-02 RX ADMIN — ESCITALOPRAM OXALATE 10 MG: 10 TABLET ORAL at 09:09

## 2019-04-02 RX ADMIN — ASPIRIN 81 MG: 81 TABLET, COATED ORAL at 09:09

## 2019-04-02 RX ADMIN — CALCIUM CARBONATE 500 MG (1,250 MG)-VITAMIN D3 200 UNIT TABLET 1 TABLET: at 09:09

## 2019-04-02 RX ADMIN — MEMANTINE HYDROCHLORIDE 5 MG: 5 TABLET ORAL at 21:31

## 2019-04-02 RX ADMIN — Medication 10 ML: at 15:20

## 2019-04-02 RX ADMIN — Medication 10 ML: at 21:32

## 2019-04-02 RX ADMIN — ARIPIPRAZOLE 12 MG: 10 TABLET ORAL at 09:09

## 2019-04-02 RX ADMIN — MEMANTINE HYDROCHLORIDE 5 MG: 5 TABLET ORAL at 09:09

## 2019-04-02 NOTE — PROGRESS NOTES
Interdisciplinary Rounds completed 04/02/19. Nursing, Case Management, Physician and PT present. Plan of care reviewed and updated.

## 2019-04-02 NOTE — PROGRESS NOTES
PHYSICAL THERAPY: Initial Assessment, Discharge and AM 4/2/2019 INPATIENT:   
[place] / [place] / Product Type: Medicare /   
  
NAME/AGE/GENDER: Josefa Berry is a 80 y.o. male PRIMARY DIAGNOSIS: Fever [R50.9] Fever [R50.9] Fever Fever ICD-10: Treatment Diagnosis:  
 Generalized Muscle Weakness (M62.81) Difficulty in walking, Not elsewhere classified (R26.2) Precaution/Allergies: 
Patient has no known allergies. ASSESSMENT:  
 
Mr. Elizabeth Lyon is an 80 y.o  male presenting to hospital with fever, increased AMS. Pt with Alzheimer's at baseline, lives in [address] [place] [address] memory care unit.  present during assessment, pt most mumbling at times and difficult to understand. Pt oriented to self only, on 5 L/min O2. Pt with no family present this am, unable to state whether on O2 in LTC. Pt states walks with walker, per chart has sitter who assists with all needs and helps to ambulate. Pt required tactile cues to initiate mobility, min a x 2 for transfer. Pt demo slight posterior lean in standing, using RW able to take small shuffled steps bed to chair with MIN A. Pt most likely is at baseline for mobility. No skilled PT services required at this time as staff can assist bed to chair mobility. PT to discharge at this time. This section established at most recent assessment PROBLEM LIST (Impairments causing functional limitations): 
Decreased ADL/Functional Activities Decreased Transfer Abilities Decreased Ambulation Ability/Technique Decreased Balance Decreased Cognition INTERVENTIONS PLANNED: (Benefits and precautions of physical therapy have been discussed with the patient.) Discharge, at baseline TREATMENT PLAN: Frequency/Duration: discharge Rehabilitation Potential For Stated Goals: discharge RECOMMENDED REHABILITATION/EQUIPMENT: (at time of discharge pending progress): Due to the probability of continued deficits (see above) this patient will not likely need continued skilled physical therapy after discharge. Equipment:  
None at this time HISTORY:  
History of Present Injury/Illness (Reason for Referral): 
Patient is an 80yoM with PMH significant for Alzheimer's dementia, CKD, CHF who presents from his nursing home with fever, hypoxia, and worsened confusion. Patient's daughter reports some wheezing earlier, but that has resolved. Daughter also reports that he seems a little less confused, but still not at baseline. Work-up in ER has overall been unremarkable. Given charted fever of 103.3 in the ER without obvious source, hospitalist were called for observation for further work-up. He currently has no specific complaints, but does not answer questions directly and needs re-direction by daughter. Past Medical History/Comorbidities: Mr. Gabriel Bzazi  has a past medical history of Alzheimer disease, Arteriosclerotic heart disease, Carotid artery stenosis, Chest pain, CHF (congestive heart failure) ([address] Chronic kidney disease, Chronic pain in testicle, Clonus, Dementia, Depression, Diabetes ([address] Dyslipidemia, Elevated PSA, Endocrine disease, Exertional dyspnea, Hemorrhoids, History of degenerative disc disease, Hypertension, Hypertrophy of nasal turbinates, Hypothyroidism, Kidney failure, Macrocytosis, Muscle spasm, Osteopenia, Osteoporosis, Prostate cancer ([address] Renal insufficiency, Sciatica, and Vitamin D deficiency. Mr. Gabriel Bazzi  has a past surgical history that includes hx hemorrhoidectomy; hx prostatectomy; and hx cholecystectomy. Social History/Living Environment:  
Home Environment: Skilled nursing facility Care [facility]: [facility] One/Two Story Residence: One story Living Alone: No 
Support Systems: Skilled nursing facility Patient Expects to be Discharged to[de-identified] Skilled nursing facility Current DME Used/Available at Home: Wheelchair, Hospital bed Prior Level of Function/Work/Activity: 
Lives in [address] [place] [address], has sitter to assist with needs, dementia at baseline Personal Factors:   
      Sex:  male Age:  80 y.o. Overall Behavior:  confused, drowsy Number of Personal Factors/Comorbidities that affect the Plan of Care: 
Age, Alzheimer, CAD 3+: HIGH COMPLEXITY EXAMINATION:  
Most Recent Physical Functioning:  
Gross Assessment: 
AROM: Generally decreased, functional 
Strength: Generally decreased, functional 
Coordination: Generally decreased, functional 
         
  
Posture: 
Posture (WDL): Exceptions to [facility] Posture Assessment: Forward head, Rounded shoulders Balance: 
Sitting: Impaired Sitting - Static: Fair (occasional); Good (unsupported) Sitting - Dynamic: Fair (occasional) Standing: Impaired Standing - Static: Constant support Standing - Dynamic : Constant support Bed Mobility: 
Supine to Sit: Moderate assistance(max A to initiate mobility) Sit to Supine: (left up in chair) Scooting: Maximum assistance; Total assistance(to EOB) Wheelchair Mobility: 
  
Transfers: 
Sit to Stand: Minimum assistance;Assist x2 Stand to Sit: Minimum assistance Bed to Chair: Minimum assistance Gait: 
  
Base of Support: Narrowed Speed/Karma: Shuffled Step Length: Left shortened;Right shortened Swing Pattern: Left symmetrical;Right symmetrical 
Gait Abnormalities: Decreased step clearance; Steppage gait Distance (ft): 2 Feet (ft)(bed to chair) Assistive Device: Walker, rolling Ambulation - Level of Assistance: Minimal assistance(slight posterior lean, directional cues) Interventions: Tactile cues Body Structures Involved: Muscles Body Functions Affected: Movement Related Activities and Participation Affected: 
General Tasks and Demands Mobility Self Care Number of elements that affect the Plan of Care: 4+: HIGH COMPLEXITY CLINICAL PRESENTATION:  
 Presentation: Evolving clinical presentation with changing clinical characteristics: MODERATE COMPLEXITY CLINICAL DECISION MAKIN[address] [place] [address] AM-PAC? ?6 Clicks? Basic Mobility Inpatient Short Form How much difficulty does the patient currently have. .. Unable A Lot A Little None 1. Turning over in bed (including adjusting bedclothes, sheets and blankets)? ? 1   ? 2   ? 3   ? 4  
2. Sitting down on and standing up from a chair with arms ( e.g., wheelchair, bedside commode, etc.)   ? 1   ? 2   ? 3   ? 4  
3. Moving from lying on back to sitting on the side of the bed?   ? 1   ? 2   ? 3   ? 4 How much help from another person does the patient currently need. .. Total A Lot A Little None 4. Moving to and from a bed to a chair (including a wheelchair)? ? 1   ? 2   ? 3   ? 4  
5. Need to walk in hospital room? ? 1   ? 2   ? 3   ? 4  
6. Climbing 3-5 steps with a railing? ? 1   ? 2   ? 3   ? 4  
© , Trustees of [address] [place] [address], under license to [employer]. All rights reserved Score:  Initial: 17 Most Recent: X (Date: -- ) Interpretation of Tool:  Represents activities that are increasingly more difficult (i.e. Bed mobility, Transfers, Gait). Medical Necessity:    
Discharge, no skilled need. Reason for Services/Other Comments: 
discharge. Use of outcome tool(s) and clinical judgement create a POC that gives a: Clear prediction of patient's progress: LOW COMPLEXITY  
  
 
 
 
TREATMENT:  
(In addition to Assessment/Re-Assessment sessions the following treatments were rendered) Pre-treatment Symptoms/Complaints:  minimal response, mumbling at times Pain: Initial:  
Pain Intensity 1: 0  Post Session:  0/10 Assessment/Reassessment only, no treatment provided today Braces/Orthotics/Lines/Etc:  
O2 Device: Nasal cannula Treatment/Session Assessment:   
Response to Treatment:  fair tolerance Interdisciplinary Collaboration:  
Physical Therapist 
 Occupational Therapist 
Registered Nurse 
hospitalist  
After treatment position/precautions:  
Up in chair Bed alarm/tab alert on Bed/Chair-wheels locked Bed in low position Call light within reach RN notified Compliance with Program/Exercises: discharge Recommendations/Intent for next treatment session: discharge Total Treatment Duration: PT Patient Time In/Time Out Time In: 1031 Time Out: 8486 Mike Venegas PT

## 2019-04-02 NOTE — PROGRESS NOTES
Hospitalist Progress Note Admit Date:  3/29/2019  6:44 PM  
Name:  Tiffani Wilson Age:  80 y.o. 
:  3/19/1931 MRN:  [id number] PCP:  Unknown, Provider Treatment Team: Attending Provider: Leroy Atkinson MD; Utilization Review: Otf Grider, ANNELISE; Care Manager: Harpreet Wetzel RN; Occupational Therapist: Xenia Stuart OT; Physical Therapist: Lisette Lakhani, PT Subjective:  
CC: fever, hypoxia, increased confusions Patient is an 79 yo male with PMH of AD, CKD, and CHF who presented via EMS from nursing facility with fever, hypoxia, and worsening confusion. Temp in .3 with no clear source. Chemistry was unremarkable. UA negative. CXR neg. IV vanc and zosyn started. Patient complained of  RLQ pain. CT abd pelvis identified bilateral pleural effusions. Also noted atherosclerosis and lumbar compression fx. Today patient continues on 5LNC. Alert and oriented x 1. CXR with apparent interval worsening of mild pulmonary edema and bilateral small pleural effusions. Patient denies CP, SOB. Reports productive cough. Does not appear to be a good historian. Objective:  
 
Patient Vitals for the past 24 hrs: 
 Temp Pulse Resp BP SpO2  
19 1259 98.8 °F (37.1 °C) 62 18 124/65 94 % 19 1121     95 % 19 1031     92 % 19 0919  60     
19 0813 99.2 °F (37.3 °C) (!) 57 18 129/62 97 % 19 0408 98 °F (36.7 °C) (!) 55 18 140/58 92 % 19 0027 98.3 °F (36.8 °C) 66 19 165/61 91 % 19 1923 98.4 °F (36.9 °C) 68 18 148/75 92 % 19 1805  63 18 150/69 95 % Oxygen Therapy O2 Sat (%): 94 % (19 1259) Pulse via Oximetry: 60 beats per minute (19 0816) O2 Device: Nasal cannula (19 1121) O2 Flow Rate (L/min): 4 l/min (19 1121) Intake/Output Summary (Last 24 hours) at 2019 1338 Last data filed at 2019 1159 Gross per 24 hour Intake 360 ml Output  Net 360 ml  
   
 
 REVIEW OF SYSTEMS: Comprehensive ROS performed and negative except as stated in HPI. Physical Examination: 
General:    Well nourished. Awake and alert. Oriented x 1 Head:  Normocephalic, atraumatic Eyes:  Extraocular movements intact CV:   RRR. No  Murmurs, clicks, or gallops Lungs:   Unlabored, no cyanosis. Diminished. [facility] Abdomen:   Soft, nondistended, indicates pain RLQc Extremities: Warm and dry. No cyanosis or edema. Skin:     No rashes or jaundice. Neuro:  No gross focal deficits Psych:  Mood and affect appropriate Data Review: 
I have reviewed all labs, meds, telemetry events, and studies from the last 24 hours. Recent Results (from the past 24 hour(s)) GLUCOSE, POC Collection Time: 04/01/19  4:59 PM  
Result Value Ref Range Glucose (POC) 107 (H) 65 - 100 mg/dL METABOLIC PANEL, BASIC Collection Time: 04/02/19  9:10 AM  
Result Value Ref Range Sodium 142 136 - 145 mmol/L Potassium 4.1 3.5 - 5.1 mmol/L Chloride 104 98 - 107 mmol/L  
 CO2 32 21 - 32 mmol/L Anion gap 6 (L) 7 - 16 mmol/L Glucose 82 65 - 100 mg/dL BUN 25 (H) 8 - 23 MG/DL Creatinine 1.24 0.8 - 1.5 MG/DL  
 GFR est AA >60 >60 ml/min/1.73m2 GFR est non-AA 58 (L) >60 ml/min/1.73m2 Calcium 8.7 8.3 - 10.4 MG/DL  
CBC WITH AUTOMATED DIFF Collection Time: 04/02/19  9:10 AM  
Result Value Ref Range WBC 6.1 4.3 - 11.1 K/uL  
 RBC 3.55 (L) 4.23 - 5.6 M/uL  
 HGB 11.6 (L) 13.6 - 17.2 g/dL HCT 36.3 (L) 41.1 - 50.3 % .3 (H) 79.6 - 97.8 FL  
 MCH 32.7 26.1 - 32.9 PG  
 MCHC 32.0 31.4 - 35.0 g/dL  
 RDW 12.3 11.9 - 14.6 % PLATELET 942 597 - 924 K/uL MPV 9.5 9.4 - 12.3 FL ABSOLUTE NRBC 0.00 0.0 - 0.2 K/uL  
 DF AUTOMATED NEUTROPHILS 60 43 - 78 % LYMPHOCYTES 20 13 - 44 % MONOCYTES 13 (H) 4.0 - 12.0 % EOSINOPHILS 6 0.5 - 7.8 % BASOPHILS 1 0.0 - 2.0 % IMMATURE GRANULOCYTES 0 0.0 - 5.0 %  
 ABS. NEUTROPHILS 3.7 1.7 - 8.2 K/UL ABS. LYMPHOCYTES 1.2 0.5 - 4.6 K/UL  
 ABS. MONOCYTES 0.8 0.1 - 1.3 K/UL  
 ABS. EOSINOPHILS 0.4 0.0 - 0.8 K/UL  
 ABS. BASOPHILS 0.1 0.0 - 0.2 K/UL  
 ABS. IMM. GRANS. 0.0 0.0 - 0.5 K/UL All Micro Results Procedure Component Value Units Date/Time CULTURE, BLOOD [[id number]] Collected:  03/29/19 1912 Order Status:  Completed Specimen:  Blood Updated:  04/02/19 7614 Special Requests: --     
  RIGHT Antecubital 
  
  Culture result: NO GROWTH 4 DAYS     
 CULTURE, BLOOD [[id number]] Collected:  03/29/19 1912 Order Status:  Completed Specimen:  Blood Updated:  04/02/19 8670 Special Requests: --     
  LEFT 
FOREARM Culture result: NO GROWTH 4 DAYS INFLUENZA A & B AG (RAPID TEST) [[id number]] Collected:  03/29/19 2007 Order Status:  Completed Specimen:  Nasopharyngeal from Nasal washing Updated:  03/29/19 2036 Influenza A Ag NEGATIVE Comment: NEGATIVE FOR THE PRESENCE OF INFLUENZA A ANTIGEN 
INFECTION DUE TO INFLUENZA A CANNOT BE RULED OUT. BECAUSE THE ANTIGEN PRESENT IN THE SAMPLE MAY BE BELOW 
THE DETECTION LIMIT OF THE TEST. A NEGATIVE TEST IS PRESUMPTIVE AND IT IS RECOMMENDED THAT THESE RESULTS BE CONFIRMED BY VIRAL CULTURE OR AN FDA-CLEARED INFLUENZA A AND B MOLECULAR ASSAY. Influenza B Ag NEGATIVE Comment: NEGATIVE FOR THE PRESENCE OF INFLUENZA B ANTIGEN 
INFECTION DUE TO INFLUENZA B CANNOT BE RULED OUT. BECAUSE THE ANTIGEN PRESENT IN THE SAMPLE MAY BE BELOW 
THE DETECTION LIMIT OF THE TEST. A NEGATIVE TEST IS PRESUMPTIVE AND IT IS RECOMMENDED THAT THESE RESULTS BE CONFIRMED BY VIRAL CULTURE OR AN FDA-CLEARED INFLUENZA A AND B MOLECULAR ASSAY. Source NASOPHARYNGEAL Current Meds: 
Current Facility-Administered Medications Medication Dose Route Frequency  levoFLOXacin (LEVAQUIN) tablet 500 mg  500 mg Oral Q48H  
 escitalopram oxalate (LEXAPRO) tablet 10 mg  10 mg Oral DAILY  levothyroxine (SYNTHROID) tablet 75 mcg  75 mcg Oral ACB  rivastigmine (EXELON) 13.3 mg/24 hour patch 1 Patch  1 Patch TransDERmal DAILY  valproic acid (as sodium salt) (DEPAKENE) 250 mg/5 mL (5 mL) oral solution 250 mg  250 mg Oral Q12H  
 ARIPiprazole (ABILIFY) tablet 12 mg  12 mg Oral Q12H  ergocalciferol capsule 50,000 Units  50,000 Units Oral every Monday  tamsulosin (FLOMAX) capsule 0.4 mg  0.4 mg Oral DAILY  furosemide (LASIX) tablet 20 mg  20 mg Oral DAILY  memantine (NAMENDA) tablet 5 mg  5 mg Oral Q12H  
 amLODIPine (NORVASC) tablet 5 mg  5 mg Oral DAILY  aspirin delayed-release tablet 81 mg  81 mg Oral DAILY  losartan (COZAAR) tablet 100 mg  100 mg Oral DAILY  oxybutynin chloride XL (DITROPAN XL) tablet 5 mg  5 mg Oral QHS  pravastatin (PRAVACHOL) tablet 20 mg  20 mg Oral QHS  calcium-vitamin D (OS-JOANN) 500 mg-200 unit tablet  1 Tab Oral BID WITH MEALS  docusate sodium (COLACE) capsule 100 mg  100 mg Oral BID  ferrous sulfate tablet 325 mg  1 Tab Oral DAILY WITH BREAKFAST  metoprolol succinate (TOPROL-XL) XL tablet 12.5 mg  12.5 mg Oral DAILY  cyanocobalamin tablet 1,000 mcg  1,000 mcg Oral DAILY  folic acid-vit L6-UWB S21 (FOLTX) 2.5-25-2 mg tablet 1 Tab  1 Tab Oral DAILY  traMADol (ULTRAM) tablet 50 mg  50 mg Oral Q6H PRN  
 albuterol (PROVENTIL VENTOLIN) nebulizer solution 2.5 mg  2.5 mg Nebulization Q4H PRN  
 sodium chloride (NS) flush 5-40 mL  5-40 mL IntraVENous Q8H  
 sodium chloride (NS) flush 5-40 mL  5-40 mL IntraVENous PRN  
 acetaminophen (TYLENOL) tablet 650 mg  650 mg Oral Q4H PRN  
 ondansetron (ZOFRAN) injection 4 mg  4 mg IntraVENous Q4H PRN  
 enoxaparin (LOVENOX) injection 40 mg  40 mg SubCUTAneous Q24H Diet: DIET CARDIAC Other Studies (last 24 hours): Xr Chest Sngl V Result Date: 4/2/2019 AP chest radiograph History: pleural effusions shown on ct abd, 88 years Male Comparison: Chest radiograph March 29, 2019 Findings:   Normal cardiomediastinal silhouette. Increasing small bilateral pleural effusions and associated bibasilar airspace opacities, which may represent atelectasis and or consolidation. Probably increased mild pulmonary edema. No evidence of pneumothorax. Visualized soft tissue and osseous structures otherwise unremarkable. Impression:  Apparent interval worsening of mild pulmonary edema and bilateral small pleural effusions. Assessment and Plan:  
 
Hospital Problems as of 4/2/2019 Date Reviewed: 9/17/2018 Codes Class Noted - Resolved POA * (Principal) Fever ICD-10-CM: R50.9 ICD-9-CM: 780.60  3/29/2019 - Present Unknown Hypertension ICD-10-CM: I10 
ICD-9-CM: 401.9  Unknown - Present Yes  
   
 CHF (congestive heart failure) (HCC) ICD-10-CM: I50.9 ICD-9-CM: 428.0  Unknown - Present Yes Hypothyroidism ICD-10-CM: E03.9 ICD-9-CM: 244.9  Unknown - Present Yes Diabetes ([facility] [address] ICD-10-CM: E11.9 ICD-9-CM: 250.00  Unknown - Present Yes Delirium ICD-10-CM: R41.0 ICD-9-CM: 780.09  11/12/2016 - Present Yes A/P:   
Fever/Delirium 
- Unclear source - CXR 
- UA is normal 
- Flu is negative - No obvious source of infection at this time - Will continue Tylenol and IV abx as blood cultures NGTD 
- CT abd/pelvis with contrast showed pulm effusions. 
-continue po levaquin  
-wean O2 
  
CHF 
-Check echo - CXR with pulmonary edema and small pleural effusions - Continue home meds - Lasix 40 IV x 1 
-I&O's  
  
DMII 
- SSI 
  
Hypothyroidism 
- Home meds 
- TSH 3.310 
  
Alzheimer's Dementia - Patient lives in [address] very pleasantly confused - Daughter reports he is fairly close to his baseline 
  
DC planning/Dispo:  Return to NH, 1-2 days DVT ppx:  Lovenox Code status: DNR Medical decision maker: No MPOA on file,  Daughter is emergency contact Case reviewed with supervising physician - YVETTE Palacios MD 
 
Signed: 
Edilson Davison NP-C

## 2019-04-02 NOTE — PROGRESS NOTES
present at bedside with physical therapy team and hospitalist. 
 
 
 
 
 
Tiffany Dooley Patient Relations & Interpreting Services 
c: [phone] / [email or web address] [address] / [place], [address] [facility] 
[email or web address]

## 2019-04-02 NOTE — PROGRESS NOTES
Problem: Self Care Deficits Care Plan (Adult) Goal: *Acute Goals and Plan of Care (Insert Text) Outcome: Progressing Towards Goal 
  
OCCUPATIONAL THERAPY: Initial Assessment, Discharge and AM 4/2/2019 INPATIENT:   
[place] / [place] / Product Type: Medicare /  
  
NAME/AGE/GENDER: Ab Bustos is a 80 y.o. male PRIMARY DIAGNOSIS:  Fever [R50.9] Fever [R50.9] Fever Fever ICD-10: Treatment Diagnosis:  
 Generalized Muscle Weakness (M62.81) Precautions/Allergies: 
   Patient has no known allergies. ASSESSMENT:  
Mr. Raymond Gan admitted with a fever and Altered Mental Status. Patient is Korean speaking only. Patient lives in a NH with history of Alzheimer's disease.  present. Patient only oriented to himself. Patient required Assist x 2 for sit to stand, and total assistance for toileting. Patient is functioning at baseline at this time and Occupational Therapy Skilled Services are not indicated at this time. Cont OT per tx plan. This section established at most recent assessment RECOMMENDED REHABILITATION/EQUIPMENT: (at time of discharge pending progress): Due to the probability of continued deficits (see above) this patient will not likely need continued skilled occupational therapy after discharge. Equipment:  
None at this time OCCUPATIONAL PROFILE AND HISTORY:  
History of Present Injury/Illness (Reason for Referral): 
Patient is an 80yoM with PMH significant for Alzheimer's dementia, CKD, CHF who presents from his nursing home with fever, hypoxia, and worsened confusion. Patient's daughter reports some wheezing earlier, but that has resolved. Daughter also reports that he seems a little less confused, but still not at baseline. Work-up in ER has overall been unremarkable.   Given charted fever of 103.3 in the ER without obvious source, hospitalist were called for observation for further work-up. He currently has no specific complaints, but does not answer questions directly and needs re-direction by daughter Past Medical History/Comorbidities: Mr. 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 Mr. La Mahmood  has a past surgical history that includes hx hemorrhoidectomy; hx prostatectomy; and hx cholecystectomy. Social History/Living Environment:  
Home Environment: Skilled nursing facility Care [facility]: [facility] One/Two Story Residence: One story Living Alone: No 
Support Systems: Skilled nursing facility Patient Expects to be Discharged to[de-identified] Skilled nursing facility Current DME Used/Available at Home: Wheelchair, Hospital bed Prior Level of Function/Work/Activity: 
Total Assist for almost ADLs. Assist with transfers. Number of Personal Factors/Comorbidities that affect the Plan of Care: Expanded review of therapy/medical records (1-2):  MODERATE COMPLEXITY ASSESSMENT OF OCCUPATIONAL PERFORMANCE[de-identified]  
Activities of Daily Living:  
Basic ADLs (From Assessment) Complex ADLs (From Assessment) Feeding: Maximum assistance Oral Facial Hygiene/Grooming: Total assistance Bathing: Total assistance Upper Body Dressing: Total assistance Lower Body Dressing: Total assistance Toileting: Total assistance Instrumental ADL Meal Preparation: Total assistance Homemaking: Total assistance Medication Management: Total assistance Financial Management: Total assistance Grooming/Bathing/Dressing Activities of Daily Living Cognitive Retraining Safety/Judgement: Fall prevention Bed/Mat Mobility Supine to Sit: Moderate assistance(max A to initiate mobility) Sit to Supine: (left up in chair) Sit to Stand: Minimum assistance;Assist x2 Stand to Sit: Minimum assistance Bed to Chair: Minimum assistance Scooting: Maximum assistance; Total assistance(to EOB) Most Recent Physical Functioning:  
Gross Assessment: 
  
         
  
Posture: 
Posture (WDL): Exceptions to [facility] Posture Assessment: Forward head, Rounded shoulders Balance: 
Sitting: Impaired Sitting - Static: Fair (occasional); Good (unsupported) Sitting - Dynamic: Fair (occasional) Standing: Impaired Standing - Static: Constant support Standing - Dynamic : Constant support Bed Mobility: 
Supine to Sit: Moderate assistance(max A to initiate mobility) Sit to Supine: (left up in chair) Scooting: Maximum assistance; Total assistance(to EOB) Wheelchair Mobility: 
  
Transfers: 
Sit to Stand: Minimum assistance;Assist x2 Stand to Sit: Minimum assistance Bed to Chair: Minimum assistance Patient Vitals for the past 6 hrs: 
 BP SpO2 O2 Flow Rate (L/min) Pulse 04/02/19 0813 129/62 97 %  (!) 57  
04/02/19 0919    60  
04/02/19 1031  92 % 5 l/min 04/02/19 1121  95 % 4 l/min 04/02/19 1259 124/65 94 %  62 Mental Status Neurologic State: Drowsy, Eyes open to voice Orientation Level: Disoriented to place, Disoriented to situation, Disoriented to time, Oriented to person Cognition: Decreased attention/concentration, Decreased command following Perseveration: No perseveration noted Safety/Judgement: Fall prevention Physical Skills Involved: 
Balance Strength Activity Tolerance Cognitive Skills Affected (resulting in the inability to perform in a timely and safe manner): 
Perception Executive Function Immediate Memory Short Term Recall Sustained Attention Divided Attention Comprehension Expression Psychosocial Skills Affected: 
Habits/Routines Social Interaction Self-Awareness Awareness of Others Social Roles Number of elements that affect the Plan of Care: 5+:  HIGH COMPLEXITY CLINICAL DECISION MAKIN[address] [place] [address] AM-PAC? ?6 Clicks? Daily Activity Inpatient Short Form How much help from another person does the patient currently need. .. Total A Lot A Little None 1. Putting on and taking off regular lower body clothing? ? 1   ? 2   ? 3   ? 4  
2. Bathing (including washing, rinsing, drying)? ? 1   ? 2   ? 3   ? 4  
3. Toileting, which includes using toilet, bedpan or urinal?   ? 1   ? 2   ? 3   ? 4  
4. Putting on and taking off regular upper body clothing? ? 1   ? 2   ? 3   ? 4  
5. Taking care of personal grooming such as brushing teeth? ? 1   ? 2   ? 3   ? 4  
6. Eating meals? ? 1   ? 2   ? 3   ? 4  
© , Trustees of [address] [place] [address], under license to [employer]. All rights reserved Score:  Initial: 7 Most Recent: X (Date: -- ) Interpretation of Tool:  Represents activities that are increasingly more difficult (i.e. Bed mobility, Transfers, Gait). Use of outcome tool(s) and clinical judgement create a POC that gives a: MODERATE COMPLEXITY  
 
 
 
TREATMENT:  
(In addition to Assessment/Re-Assessment sessions the following treatments were rendered) Pre-treatment Symptoms/Complaints:   
Pain: Initial:  
Pain Intensity 1: 0 0 Post Session:  0 Assessment/Reassessment only, no treatment provided today Braces/Orthotics/Lines/Etc:  
IV 
O2 Device: Nasal cannula Treatment/Session Assessment:   
Response to Treatment:  fair Interdisciplinary Collaboration:  
Physical Therapist 
Registered Nurse Physician  After treatment position/precautions:  
Up in chair Bed alarm/tab alert on Bed/Chair-wheels locked Bed in low position Caregiver at bedside Call light within reach RN notified Compliance with Program/Exercises: Compliant most of the time. Total Treatment Duration: OT Patient Time In/Time Out Time In: 1020 Time Out: 1030 Aida Burdick, OT

## 2019-04-02 NOTE — PROGRESS NOTES
04/02/19 1435 Oxygen Therapy O2 Sat (%) 95 % O2 Device Nasal cannula O2 Flow Rate (L/min) 2 l/min 
(weaned from 5lpm)

## 2019-04-02 NOTE — PROGRESS NOTES
Pedro Jolley, the  to come and assist with communication with Kayode Yaritza Botello. She will be to floor shortly after she is finished with her current patient.

## 2019-04-02 NOTE — PROGRESS NOTES
Interpreting services available today from 6:00 am-4:30 pm. Please call Rahel Bryson, \[facility] [place] [facility]\""  at [phone] for any requests. Rahel Molina Patient Relations & Interpreting Services 
c: [phone] / [email or web address] 
[address] / [place], [address] [facility] 
[email or web address]. [email or web address]

## 2019-04-02 NOTE — PROGRESS NOTES
rounded on patient and was present for assessment with [address]. Thank you, SHAD Garrison / 
Venessa Saavedra Patient Relations & Interpreting Services 
c: [phone] / [email or web address] [address] / [place], [address] [facility] 
[email or web address]

## 2019-04-02 NOTE — PROGRESS NOTES
Hourly rounding completed on this shift. No new complaints at this time. All needs met. Daily weight today is 155.7 lbs.  present during this shift to assist with  needs. Pt is currently resting in bed. Will continue to monitor and give report to oncoming nurse.

## 2019-04-02 NOTE — PROGRESS NOTES
Patient had multiple of urine occurences this shift. Patient incontinent to briefs but each time brief was very soiled. Had 1 BM this shift as well. Lasix IV given. Patient ate all meals this shift and tolerated well. Hourly rounds performed this shift. Bed lowered and locked, side rails x2, and call light in reach. All patient needs are met at this time. Will continue to monitor and give bedside shift report to oncoming nurse.

## 2019-04-02 NOTE — PROGRESS NOTES
available on-site from 4:30 p.m. - 1:00 a.m. Please call [phone] with any interpreting requests. Thank you, SHAD Cobos / 
Neftali Medina Patient Relations & Interpreting Services 
c: [phone] / [email or web address] 
[address] / [place], [address] [facility] 
[email or web address] [employer]

## 2019-04-02 NOTE — PROGRESS NOTES
Came in to give patient medication. Oxygen was taken off by patient and O2 saturation was 80. Reapplied NC and patient is currently on 5L NC with oxygen saturation 95%. Patient is reating in bed at this time.

## 2019-04-02 NOTE — PROGRESS NOTES
Problem: Falls - Risk of 
Goal: *Absence of Falls Description Document Shiva Tabor Fall Risk and appropriate interventions in the flowsheet. Outcome: Progressing Towards Goal 
  
Problem: Patient Education: Go to Patient Education Activity Goal: Patient/Family Education Outcome: Progressing Towards Goal 
  
Problem: Pressure Injury - Risk of 
Goal: *Prevention of pressure injury Description Document Bebo Scale and appropriate interventions in the flowsheet. Outcome: Progressing Towards Goal 
  
Problem: Patient Education: Go to Patient Education Activity Goal: Patient/Family Education Outcome: Progressing Towards Goal 
  
Problem: Communication Impaired (Adult) Goal: *Speech Goal: (INSERT TEXT) Description French speaking Outcome: Progressing Towards Goal 
  
Problem: Nutrition Deficit Goal: *Optimize nutritional status Outcome: Progressing Towards Goal

## 2019-04-03 ENCOUNTER — APPOINTMENT (OUTPATIENT)
Dept: GENERAL RADIOLOGY | Age: 84
DRG: 871 | End: 2019-04-03
Attending: NURSE PRACTITIONER
Payer: MEDICARE

## 2019-04-03 LAB
ANION GAP SERPL CALC-SCNC: 10 MMOL/L (ref 7–16)
BACTERIA SPEC CULT: NORMAL
BACTERIA SPEC CULT: NORMAL
BASOPHILS # BLD: 0 K/UL (ref 0–0.2)
BASOPHILS NFR BLD: 1 % (ref 0–2)
BUN SERPL-MCNC: 31 MG/DL (ref 8–23)
CALCIUM SERPL-MCNC: 8.7 MG/DL (ref 8.3–10.4)
CHLORIDE SERPL-SCNC: 103 MMOL/L (ref 98–107)
CO2 SERPL-SCNC: 28 MMOL/L (ref 21–32)
CREAT SERPL-MCNC: 1.37 MG/DL (ref 0.8–1.5)
DIFFERENTIAL METHOD BLD: ABNORMAL
EOSINOPHIL # BLD: 0.3 K/UL (ref 0–0.8)
EOSINOPHIL NFR BLD: 5 % (ref 0.5–7.8)
ERYTHROCYTE [DISTWIDTH] IN BLOOD BY AUTOMATED COUNT: 12.3 % (ref 11.9–14.6)
GLUCOSE SERPL-MCNC: 77 MG/DL (ref 65–100)
HCT VFR BLD AUTO: 35.5 % (ref 41.1–50.3)
HGB BLD-MCNC: 11.6 G/DL (ref 13.6–17.2)
IMM GRANULOCYTES # BLD AUTO: 0 K/UL (ref 0–0.5)
IMM GRANULOCYTES NFR BLD AUTO: 0 % (ref 0–5)
LYMPHOCYTES # BLD: 1.7 K/UL (ref 0.5–4.6)
LYMPHOCYTES NFR BLD: 26 % (ref 13–44)
MCH RBC QN AUTO: 33.1 PG (ref 26.1–32.9)
MCHC RBC AUTO-ENTMCNC: 32.7 G/DL (ref 31.4–35)
MCV RBC AUTO: 101.4 FL (ref 79.6–97.8)
MONOCYTES # BLD: 0.8 K/UL (ref 0.1–1.3)
MONOCYTES NFR BLD: 13 % (ref 4–12)
NEUTS SEG # BLD: 3.6 K/UL (ref 1.7–8.2)
NEUTS SEG NFR BLD: 56 % (ref 43–78)
NRBC # BLD: 0 K/UL (ref 0–0.2)
PLATELET # BLD AUTO: 193 K/UL (ref 150–450)
PMV BLD AUTO: 9.5 FL (ref 9.4–12.3)
POTASSIUM SERPL-SCNC: 4 MMOL/L (ref 3.5–5.1)
RBC # BLD AUTO: 3.5 M/UL (ref 4.23–5.6)
SERVICE CMNT-IMP: NORMAL
SERVICE CMNT-IMP: NORMAL
SODIUM SERPL-SCNC: 141 MMOL/L (ref 136–145)
WBC # BLD AUTO: 6.5 K/UL (ref 4.3–11.1)

## 2019-04-03 PROCEDURE — 74011250637 HC RX REV CODE- 250/637: Performed by: FAMILY MEDICINE

## 2019-04-03 PROCEDURE — 74011250636 HC RX REV CODE- 250/636: Performed by: FAMILY MEDICINE

## 2019-04-03 PROCEDURE — 85025 COMPLETE CBC W/AUTO DIFF WBC: CPT

## 2019-04-03 PROCEDURE — 80048 BASIC METABOLIC PNL TOTAL CA: CPT

## 2019-04-03 PROCEDURE — 71045 X-RAY EXAM CHEST 1 VIEW: CPT

## 2019-04-03 PROCEDURE — 77010033678 HC OXYGEN DAILY

## 2019-04-03 PROCEDURE — 65270000029 HC RM PRIVATE

## 2019-04-03 PROCEDURE — 93306 TTE W/DOPPLER COMPLETE: CPT

## 2019-04-03 PROCEDURE — 36415 COLL VENOUS BLD VENIPUNCTURE: CPT

## 2019-04-03 PROCEDURE — 94760 N-INVAS EAR/PLS OXIMETRY 1: CPT

## 2019-04-03 RX ADMIN — TRAMADOL HYDROCHLORIDE 50 MG: 50 TABLET, COATED ORAL at 15:22

## 2019-04-03 RX ADMIN — DOCUSATE SODIUM 100 MG: 100 CAPSULE, LIQUID FILLED ORAL at 09:41

## 2019-04-03 RX ADMIN — AMLODIPINE BESYLATE 5 MG: 5 TABLET ORAL at 09:41

## 2019-04-03 RX ADMIN — METOPROLOL SUCCINATE 12.5 MG: 25 TABLET, EXTENDED RELEASE ORAL at 09:41

## 2019-04-03 RX ADMIN — PRAVASTATIN SODIUM 20 MG: 20 TABLET ORAL at 21:08

## 2019-04-03 RX ADMIN — MEMANTINE HYDROCHLORIDE 5 MG: 5 TABLET ORAL at 21:09

## 2019-04-03 RX ADMIN — Medication 10 ML: at 05:03

## 2019-04-03 RX ADMIN — TAMSULOSIN HYDROCHLORIDE 0.4 MG: 0.4 CAPSULE ORAL at 09:41

## 2019-04-03 RX ADMIN — VALPROIC ACID 250 MG: 250 SOLUTION ORAL at 09:42

## 2019-04-03 RX ADMIN — DOCUSATE SODIUM 100 MG: 100 CAPSULE, LIQUID FILLED ORAL at 17:17

## 2019-04-03 RX ADMIN — LEVOTHYROXINE SODIUM 75 MCG: 75 TABLET ORAL at 05:03

## 2019-04-03 RX ADMIN — MEMANTINE HYDROCHLORIDE 5 MG: 5 TABLET ORAL at 09:42

## 2019-04-03 RX ADMIN — CALCIUM CARBONATE 500 MG (1,250 MG)-VITAMIN D3 200 UNIT TABLET 1 TABLET: at 09:41

## 2019-04-03 RX ADMIN — ESCITALOPRAM OXALATE 10 MG: 10 TABLET ORAL at 09:41

## 2019-04-03 RX ADMIN — CYANOCOBALAMIN TAB 1000 MCG 1000 MCG: 1000 TAB at 09:42

## 2019-04-03 RX ADMIN — LOSARTAN POTASSIUM 100 MG: 50 TABLET ORAL at 09:42

## 2019-04-03 RX ADMIN — ARIPIPRAZOLE 12 MG: 10 TABLET ORAL at 21:08

## 2019-04-03 RX ADMIN — FERROUS SULFATE TAB 325 MG (65 MG ELEMENTAL FE) 325 MG: 325 (65 FE) TAB at 09:41

## 2019-04-03 RX ADMIN — Medication 1 TABLET: at 09:42

## 2019-04-03 RX ADMIN — VALPROIC ACID 250 MG: 250 SOLUTION ORAL at 21:08

## 2019-04-03 RX ADMIN — ENOXAPARIN SODIUM 40 MG: 40 INJECTION SUBCUTANEOUS at 22:30

## 2019-04-03 RX ADMIN — ARIPIPRAZOLE 12 MG: 10 TABLET ORAL at 09:42

## 2019-04-03 RX ADMIN — FUROSEMIDE 20 MG: 20 TABLET ORAL at 09:41

## 2019-04-03 RX ADMIN — Medication 10 ML: at 21:09

## 2019-04-03 RX ADMIN — OXYBUTYNIN CHLORIDE 5 MG: 5 TABLET, EXTENDED RELEASE ORAL at 21:09

## 2019-04-03 RX ADMIN — ASPIRIN 81 MG: 81 TABLET, COATED ORAL at 09:42

## 2019-04-03 RX ADMIN — CALCIUM CARBONATE 500 MG (1,250 MG)-VITAMIN D3 200 UNIT TABLET 1 TABLET: at 17:17

## 2019-04-03 NOTE — PROGRESS NOTES
Hospitalist Progress Note Admit Date:  3/29/2019  6:44 PM  
Name:  Caio Zhang Age:  80 y.o. 
:  3/19/1931 MRN:  [id number] PCP:  Unknown, Provider Treatment Team: Attending Provider: Jimmie Henson MD; Utilization Review: Leo Ayoub RN; Care Manager: Raven Pinto RN Subjective:  
CC: fever, hypoxia, increased confusions Patient is an 81 yo male with PMH of AD, CKD, and CHF who presented via EMS from nursing facility with fever, hypoxia, and worsening confusion. Temp in .3 with no clear source. Chemistry was unremarkable. UA negative. CXR neg. IV vanc and zosyn started. Patient complained of  RLQ pain. CT abd pelvis identified bilateral pleural effusions. Also noted atherosclerosis and lumbar compression fx. On , patient on 5LNC - CXR with apparent interval worsening of mild pulmonary edema and bilateral small pleural effusions. Patient given lasix IV x 1. Today cxr with some improvement in pulmonary infiltrates and basilar effusion. Nursing noted several episodes of incontinence. Patient on [facility] this morning. Afebrile. No leukocytosis. Objective:  
 
Patient Vitals for the past 24 hrs: 
 Temp Pulse Resp BP SpO2  
19 0824 97.3 °F (36.3 °C) 63 16 144/60 90 % 19 0748     93 % 19 0341 98.1 °F (36.7 °C) (!) 54 18 151/50 90 % 19 0017 98.5 °F (36.9 °C) 60 18 135/68 93 % 19 1949 98.5 °F (36.9 °C) 65 18 139/74 94 % 19 1720 98.4 °F (36.9 °C) 62 18 143/59 95 % 19 1435     95 % 19 1259 98.8 °F (37.1 °C) 62 18 124/65 94 % 19 1121     95 % 19 1031     92 % Oxygen Therapy O2 Sat (%): 90 % (19 0824) Pulse via Oximetry: 56 beats per minute (04/03/19 0748) O2 Device: Nasal cannula (19) O2 Flow Rate (L/min): 2 l/min (19) Intake/Output Summary (Last 24 hours) at 4/3/2019 0950 Last data filed at 4/3/2019 0015 Gross per 24 hour Intake 1196 ml Output  Net 1196 ml REVIEW OF SYSTEMS: Comprehensive ROS performed and negative except as stated in HPI. Physical Examination: 
General:    Well nourished. Awake and alert. Oriented x 1 Head:  Normocephalic, atraumatic Eyes:  Extraocular movements intact CV:   RRR. No  Murmurs, clicks, or gallops Lungs:   Unlabored, no cyanosis. Diminished. 2LNC Abdomen:   Soft, nondistended, indicates pain RLQc Extremities: Warm and dry. No cyanosis or edema. Skin:     No rashes or jaundice. Neuro:  No gross focal deficits Psych:  Mood and affect appropriate Data Review: 
I have reviewed all labs, meds, telemetry events, and studies from the last 24 hours. Recent Results (from the past 24 hour(s)) METABOLIC PANEL, BASIC Collection Time: 04/03/19  4:31 AM  
Result Value Ref Range Sodium 141 136 - 145 mmol/L Potassium 4.0 3.5 - 5.1 mmol/L Chloride 103 98 - 107 mmol/L  
 CO2 28 21 - 32 mmol/L Anion gap 10 7 - 16 mmol/L Glucose 77 65 - 100 mg/dL BUN 31 (H) 8 - 23 MG/DL Creatinine 1.37 0.8 - 1.5 MG/DL  
 GFR est AA >60 >60 ml/min/1.73m2 GFR est non-AA 52 (L) >60 ml/min/1.73m2 Calcium 8.7 8.3 - 10.4 MG/DL  
CBC WITH AUTOMATED DIFF Collection Time: 04/03/19  4:31 AM  
Result Value Ref Range WBC 6.5 4.3 - 11.1 K/uL  
 RBC 3.50 (L) 4.23 - 5.6 M/uL  
 HGB 11.6 (L) 13.6 - 17.2 g/dL HCT 35.5 (L) 41.1 - 50.3 % .4 (H) 79.6 - 97.8 FL  
 MCH 33.1 (H) 26.1 - 32.9 PG  
 MCHC 32.7 31.4 - 35.0 g/dL  
 RDW 12.3 11.9 - 14.6 % PLATELET 746 665 - 678 K/uL MPV 9.5 9.4 - 12.3 FL ABSOLUTE NRBC 0.00 0.0 - 0.2 K/uL  
 DF AUTOMATED NEUTROPHILS 56 43 - 78 % LYMPHOCYTES 26 13 - 44 % MONOCYTES 13 (H) 4.0 - 12.0 % EOSINOPHILS 5 0.5 - 7.8 % BASOPHILS 1 0.0 - 2.0 % IMMATURE GRANULOCYTES 0 0.0 - 5.0 %  
 ABS. NEUTROPHILS 3.6 1.7 - 8.2 K/UL  
 ABS. LYMPHOCYTES 1.7 0.5 - 4.6 K/UL  
 ABS. MONOCYTES 0.8 0.1 - 1.3 K/UL ABS. EOSINOPHILS 0.3 0.0 - 0.8 K/UL  
 ABS. BASOPHILS 0.0 0.0 - 0.2 K/UL  
 ABS. IMM. GRANS. 0.0 0.0 - 0.5 K/UL All Micro Results Procedure Component Value Units Date/Time CULTURE, BLOOD [[id number]] Collected:  03/29/19 1912 Order Status:  Completed Specimen:  Blood Updated:  04/03/19 0910 Special Requests: --     
  RIGHT Antecubital 
  
  Culture result: NO GROWTH 5 DAYS     
 CULTURE, BLOOD [[id number]] Collected:  03/29/19 1912 Order Status:  Completed Specimen:  Blood Updated:  04/03/19 0910 Special Requests: --     
  LEFT 
FOREARM Culture result: NO GROWTH 5 DAYS INFLUENZA A & B AG (RAPID TEST) [[id number]] Collected:  03/29/19 2007 Order Status:  Completed Specimen:  Nasopharyngeal from Nasal washing Updated:  03/29/19 2036 Influenza A Ag NEGATIVE Comment: NEGATIVE FOR THE PRESENCE OF INFLUENZA A ANTIGEN 
INFECTION DUE TO INFLUENZA A CANNOT BE RULED OUT. BECAUSE THE ANTIGEN PRESENT IN THE SAMPLE MAY BE BELOW 
THE DETECTION LIMIT OF THE TEST. A NEGATIVE TEST IS PRESUMPTIVE AND IT IS RECOMMENDED THAT THESE RESULTS BE CONFIRMED BY VIRAL CULTURE OR AN FDA-CLEARED INFLUENZA A AND B MOLECULAR ASSAY. Influenza B Ag NEGATIVE Comment: NEGATIVE FOR THE PRESENCE OF INFLUENZA B ANTIGEN 
INFECTION DUE TO INFLUENZA B CANNOT BE RULED OUT. BECAUSE THE ANTIGEN PRESENT IN THE SAMPLE MAY BE BELOW 
THE DETECTION LIMIT OF THE TEST. A NEGATIVE TEST IS PRESUMPTIVE AND IT IS RECOMMENDED THAT THESE RESULTS BE CONFIRMED BY VIRAL CULTURE OR AN FDA-CLEARED INFLUENZA A AND B MOLECULAR ASSAY. Source NASOPHARYNGEAL Current Meds: 
Current Facility-Administered Medications Medication Dose Route Frequency  levoFLOXacin (LEVAQUIN) tablet 500 mg  500 mg Oral Q48H  
 escitalopram oxalate (LEXAPRO) tablet 10 mg  10 mg Oral DAILY  levothyroxine (SYNTHROID) tablet 75 mcg  75 mcg Oral ACB  rivastigmine (EXELON) 13.3 mg/24 hour patch 1 Patch  1 Patch TransDERmal DAILY  valproic acid (as sodium salt) (DEPAKENE) 250 mg/5 mL (5 mL) oral solution 250 mg  250 mg Oral Q12H  
 ARIPiprazole (ABILIFY) tablet 12 mg  12 mg Oral Q12H  ergocalciferol capsule 50,000 Units  50,000 Units Oral every Monday  tamsulosin (FLOMAX) capsule 0.4 mg  0.4 mg Oral DAILY  furosemide (LASIX) tablet 20 mg  20 mg Oral DAILY  memantine (NAMENDA) tablet 5 mg  5 mg Oral Q12H  
 amLODIPine (NORVASC) tablet 5 mg  5 mg Oral DAILY  aspirin delayed-release tablet 81 mg  81 mg Oral DAILY  losartan (COZAAR) tablet 100 mg  100 mg Oral DAILY  oxybutynin chloride XL (DITROPAN XL) tablet 5 mg  5 mg Oral QHS  pravastatin (PRAVACHOL) tablet 20 mg  20 mg Oral QHS  calcium-vitamin D (OS-JOANN) 500 mg-200 unit tablet  1 Tab Oral BID WITH MEALS  docusate sodium (COLACE) capsule 100 mg  100 mg Oral BID  ferrous sulfate tablet 325 mg  1 Tab Oral DAILY WITH BREAKFAST  metoprolol succinate (TOPROL-XL) XL tablet 12.5 mg  12.5 mg Oral DAILY  cyanocobalamin tablet 1,000 mcg  1,000 mcg Oral DAILY  folic acid-vit D1-GXL Q08 (FOLTX) 2.5-25-2 mg tablet 1 Tab  1 Tab Oral DAILY  traMADol (ULTRAM) tablet 50 mg  50 mg Oral Q6H PRN  
 albuterol (PROVENTIL VENTOLIN) nebulizer solution 2.5 mg  2.5 mg Nebulization Q4H PRN  
 sodium chloride (NS) flush 5-40 mL  5-40 mL IntraVENous Q8H  
 sodium chloride (NS) flush 5-40 mL  5-40 mL IntraVENous PRN  
 acetaminophen (TYLENOL) tablet 650 mg  650 mg Oral Q4H PRN  
 ondansetron (ZOFRAN) injection 4 mg  4 mg IntraVENous Q4H PRN  
 enoxaparin (LOVENOX) injection 40 mg  40 mg SubCUTAneous Q24H Diet: DIET CARDIAC Other Studies (last 24 hours): Xr Chest Sngl V Result Date: 4/3/2019 CHEST RADIOGRAPH, 1 views, 4/3/2019 History: Pulmonary edema and bilateral effusions.  Technique: Portable frontal view of the chest. Comparison: Chest radiograph 4/2/2019 Findings: Stable mild cardiomegaly is seen. Lungs are expanded without appreciable pneumothorax. Improving basilar airspace changes are seen which appear to represent accommodation of improving pulmonary edema and atelectasis. Trace to small basilar effusions also appear slightly improved on the left. IMPRESSION: 1. Some improvement in pulmonary infiltrates and basilar effusions as described above. Assessment and Plan:  
 
Hospital Problems as of 4/3/2019 Date Reviewed: 9/17/2018 Codes Class Noted - Resolved POA * (Principal) Fever ICD-10-CM: R50.9 ICD-9-CM: 780.60  3/29/2019 - Present Unknown Hypertension ICD-10-CM: I10 
ICD-9-CM: 401.9  Unknown - Present Yes  
   
 CHF (congestive heart failure) (HCC) ICD-10-CM: I50.9 ICD-9-CM: 428.0  Unknown - Present Yes Hypothyroidism ICD-10-CM: E03.9 ICD-9-CM: 244.9  Unknown - Present Yes Diabetes ([facility] [address] ICD-10-CM: E11.9 ICD-9-CM: 250.00  Unknown - Present Yes Delirium ICD-10-CM: R41.0 ICD-9-CM: 780.09  11/12/2016 - Present Yes A/P:   
Fever/Delirium 
- Unclear source - CXR 
- UA is normal 
- Flu is negative - No obvious source of infection at this time - Will continue Tylenol and IV abx as blood cultures NGTD 
- CT abd/pelvis with contrast showed pulm effusions. 
-continue po levaquin  
-wean O2 
  
CHF 
-Check echo - CXR with pulmonary edema and small pleural effusions - results pending 
- Continue home meds 
-I&O's  
  
DMII 
- SSI 
  
Hypothyroidism 
- Home meds 
- TSH 3.310 
  
Alzheimer's Dementia - Patient lives in [address] very pleasantly confused - Daughter reports he is fairly close to his baseline 
  
DC planning/Dispo:  Return to NH, 1-2 days DVT ppx:  Lovenox Code status: DNR Medical decision maker: No MPOA on file,  Daughter is emergency contact Case reviewed with supervising physician - Dr. Franck Schaeffer Signed: 
JAYME Julian

## 2019-04-03 NOTE — ROUTINE PROCESS
Rounded on patient with RN, no interpreting services needed at the moment. Katlyn Pagan [facility]//  Patient Relations & Interpreting Services 
p: [phone] / [email or web address]

## 2019-04-03 NOTE — PROGRESS NOTES
Interpreting services available today from 6:00 am-4:30 pm. Please call [facility]  at [phone] for any requests. Malini Smith Patient Relations & Interpreting Services 
c: [phone] / [email or web address] [address] / [place], [facility] 
[email or web address]

## 2019-04-03 NOTE — PROGRESS NOTES
Problem: Falls - Risk of 
Goal: *Absence of Falls Description Document Clide Failing Fall Risk and appropriate interventions in the flowsheet. Outcome: Progressing Towards Goal 
  
Problem: Patient Education: Go to Patient Education Activity Goal: Patient/Family Education Outcome: Progressing Towards Goal 
  
Problem: Pressure Injury - Risk of 
Goal: *Prevention of pressure injury Description Document Bebo Scale and appropriate interventions in the flowsheet. Outcome: Progressing Towards Goal 
  
Problem: Patient Education: Go to Patient Education Activity Goal: Patient/Family Education Outcome: Progressing Towards Goal 
  
Problem: Communication Impaired (Adult) Goal: *Speech Goal: (INSERT TEXT) Description Equ[place]inean speaking Outcome: Progressing Towards Goal 
  
Problem: Nutrition Deficit Goal: *Optimize nutritional status Outcome: Progressing Towards Goal 
  
Problem: Patient Education: Go to Patient Education Activity Goal: Patient/Family Education Outcome: Progressing Towards Goal

## 2019-04-03 NOTE — PROGRESS NOTES
Interdisciplinary Rounds completed 04/03/19. Nursing, Case Management, Physician and PT present. Plan of care reviewed and updated.

## 2019-04-03 NOTE — PROGRESS NOTES
Hourly rounding completed. Patient rested throughout the night. Patient took in 236 mL PO, and had 2 unmeasurable urine occurrences. Weight this morning was 155.3lbs (70.444kg). All needs met at this time.

## 2019-04-03 NOTE — PROGRESS NOTES
available on-site from 4:30 p.m. - 1:00 a.m. Please call [phone] with any interpreting requests. Thank you, Marixa Lau, SHAD STOVALL / 
Hudson Vu Patient Relations & Interpreting Services 
c: [phone] / [email or web address] [address] / [place], [address] [facility] 
[email or web address]

## 2019-04-04 ENCOUNTER — APPOINTMENT (OUTPATIENT)
Dept: CT IMAGING | Age: 84
DRG: 871 | End: 2019-04-04
Attending: NURSE PRACTITIONER
Payer: MEDICARE

## 2019-04-04 LAB
ANION GAP SERPL CALC-SCNC: 8 MMOL/L (ref 7–16)
ATRIAL RATE: 59 BPM
BASOPHILS # BLD: 0.1 K/UL (ref 0–0.2)
BASOPHILS NFR BLD: 1 % (ref 0–2)
BUN SERPL-MCNC: 33 MG/DL (ref 8–23)
CALCIUM SERPL-MCNC: 8.8 MG/DL (ref 8.3–10.4)
CALCULATED P AXIS, ECG09: 31 DEGREES
CALCULATED R AXIS, ECG10: -13 DEGREES
CALCULATED T AXIS, ECG11: -151 DEGREES
CHLORIDE SERPL-SCNC: 103 MMOL/L (ref 98–107)
CO2 SERPL-SCNC: 30 MMOL/L (ref 21–32)
CREAT SERPL-MCNC: 1.29 MG/DL (ref 0.8–1.5)
DIAGNOSIS, 93000: NORMAL
DIFFERENTIAL METHOD BLD: ABNORMAL
EOSINOPHIL # BLD: 0.4 K/UL (ref 0–0.8)
EOSINOPHIL NFR BLD: 7 % (ref 0.5–7.8)
ERYTHROCYTE [DISTWIDTH] IN BLOOD BY AUTOMATED COUNT: 12.2 % (ref 11.9–14.6)
GLUCOSE SERPL-MCNC: 85 MG/DL (ref 65–100)
HCT VFR BLD AUTO: 36.8 % (ref 41.1–50.3)
HGB BLD-MCNC: 11.7 G/DL (ref 13.6–17.2)
IMM GRANULOCYTES # BLD AUTO: 0 K/UL (ref 0–0.5)
IMM GRANULOCYTES NFR BLD AUTO: 0 % (ref 0–5)
LYMPHOCYTES # BLD: 1.6 K/UL (ref 0.5–4.6)
LYMPHOCYTES NFR BLD: 25 % (ref 13–44)
MCH RBC QN AUTO: 32.7 PG (ref 26.1–32.9)
MCHC RBC AUTO-ENTMCNC: 31.8 G/DL (ref 31.4–35)
MCV RBC AUTO: 102.8 FL (ref 79.6–97.8)
MONOCYTES # BLD: 0.8 K/UL (ref 0.1–1.3)
MONOCYTES NFR BLD: 13 % (ref 4–12)
NEUTS SEG # BLD: 3.4 K/UL (ref 1.7–8.2)
NEUTS SEG NFR BLD: 54 % (ref 43–78)
NRBC # BLD: 0 K/UL (ref 0–0.2)
P-R INTERVAL, ECG05: 196 MS
PLATELET # BLD AUTO: 206 K/UL (ref 150–450)
PMV BLD AUTO: 9.7 FL (ref 9.4–12.3)
POTASSIUM SERPL-SCNC: 3.7 MMOL/L (ref 3.5–5.1)
Q-T INTERVAL, ECG07: 440 MS
QRS DURATION, ECG06: 66 MS
QTC CALCULATION (BEZET), ECG08: 435 MS
RBC # BLD AUTO: 3.58 M/UL (ref 4.23–5.6)
SODIUM SERPL-SCNC: 141 MMOL/L (ref 136–145)
TROPONIN I SERPL-MCNC: <0.02 NG/ML (ref 0.02–0.05)
VENTRICULAR RATE, ECG03: 59 BPM
WBC # BLD AUTO: 6.2 K/UL (ref 4.3–11.1)

## 2019-04-04 PROCEDURE — 71250 CT THORAX DX C-: CPT

## 2019-04-04 PROCEDURE — 0T9B70Z DRAINAGE OF BLADDER WITH DRAINAGE DEVICE, VIA NATURAL OR ARTIFICIAL OPENING: ICD-10-PCS | Performed by: FAMILY MEDICINE

## 2019-04-04 PROCEDURE — 65270000029 HC RM PRIVATE

## 2019-04-04 PROCEDURE — 93005 ELECTROCARDIOGRAM TRACING: CPT | Performed by: NURSE PRACTITIONER

## 2019-04-04 PROCEDURE — 36415 COLL VENOUS BLD VENIPUNCTURE: CPT

## 2019-04-04 PROCEDURE — 74011250637 HC RX REV CODE- 250/637: Performed by: NURSE PRACTITIONER

## 2019-04-04 PROCEDURE — 74011250636 HC RX REV CODE- 250/636: Performed by: FAMILY MEDICINE

## 2019-04-04 PROCEDURE — 80048 BASIC METABOLIC PNL TOTAL CA: CPT

## 2019-04-04 PROCEDURE — 84484 ASSAY OF TROPONIN QUANT: CPT

## 2019-04-04 PROCEDURE — 77010033678 HC OXYGEN DAILY

## 2019-04-04 PROCEDURE — 85025 COMPLETE CBC W/AUTO DIFF WBC: CPT

## 2019-04-04 PROCEDURE — 74011250637 HC RX REV CODE- 250/637: Performed by: FAMILY MEDICINE

## 2019-04-04 PROCEDURE — 94760 N-INVAS EAR/PLS OXIMETRY 1: CPT

## 2019-04-04 RX ADMIN — CALCIUM CARBONATE 500 MG (1,250 MG)-VITAMIN D3 200 UNIT TABLET 1 TABLET: at 09:02

## 2019-04-04 RX ADMIN — LEVOFLOXACIN 500 MG: 500 TABLET, FILM COATED ORAL at 13:39

## 2019-04-04 RX ADMIN — LOSARTAN POTASSIUM 100 MG: 50 TABLET ORAL at 09:01

## 2019-04-04 RX ADMIN — MEMANTINE HYDROCHLORIDE 5 MG: 5 TABLET ORAL at 22:11

## 2019-04-04 RX ADMIN — OXYBUTYNIN CHLORIDE 5 MG: 5 TABLET, EXTENDED RELEASE ORAL at 22:11

## 2019-04-04 RX ADMIN — TAMSULOSIN HYDROCHLORIDE 0.4 MG: 0.4 CAPSULE ORAL at 09:01

## 2019-04-04 RX ADMIN — MEMANTINE HYDROCHLORIDE 5 MG: 5 TABLET ORAL at 09:01

## 2019-04-04 RX ADMIN — CYANOCOBALAMIN TAB 1000 MCG 1000 MCG: 1000 TAB at 09:01

## 2019-04-04 RX ADMIN — METOPROLOL SUCCINATE 12.5 MG: 25 TABLET, EXTENDED RELEASE ORAL at 09:01

## 2019-04-04 RX ADMIN — ASPIRIN 81 MG: 81 TABLET, COATED ORAL at 09:01

## 2019-04-04 RX ADMIN — ENOXAPARIN SODIUM 40 MG: 40 INJECTION SUBCUTANEOUS at 22:12

## 2019-04-04 RX ADMIN — AMLODIPINE BESYLATE 5 MG: 5 TABLET ORAL at 09:02

## 2019-04-04 RX ADMIN — Medication 10 ML: at 13:39

## 2019-04-04 RX ADMIN — LEVOTHYROXINE SODIUM 75 MCG: 75 TABLET ORAL at 05:06

## 2019-04-04 RX ADMIN — CALCIUM CARBONATE 500 MG (1,250 MG)-VITAMIN D3 200 UNIT TABLET 1 TABLET: at 17:33

## 2019-04-04 RX ADMIN — VALPROIC ACID 250 MG: 250 SOLUTION ORAL at 09:01

## 2019-04-04 RX ADMIN — ARIPIPRAZOLE 12 MG: 10 TABLET ORAL at 22:11

## 2019-04-04 RX ADMIN — Medication 1 TABLET: at 09:02

## 2019-04-04 RX ADMIN — Medication 10 ML: at 22:11

## 2019-04-04 RX ADMIN — FUROSEMIDE 20 MG: 20 TABLET ORAL at 09:01

## 2019-04-04 RX ADMIN — Medication 10 ML: at 05:06

## 2019-04-04 RX ADMIN — VALPROIC ACID 250 MG: 250 SOLUTION ORAL at 22:11

## 2019-04-04 RX ADMIN — ARIPIPRAZOLE 12 MG: 10 TABLET ORAL at 09:02

## 2019-04-04 RX ADMIN — ESCITALOPRAM OXALATE 10 MG: 10 TABLET ORAL at 09:01

## 2019-04-04 RX ADMIN — FERROUS SULFATE TAB 325 MG (65 MG ELEMENTAL FE) 325 MG: 325 (65 FE) TAB at 09:01

## 2019-04-04 RX ADMIN — PRAVASTATIN SODIUM 20 MG: 20 TABLET ORAL at 22:11

## 2019-04-04 RX ADMIN — DOCUSATE SODIUM 100 MG: 100 CAPSULE, LIQUID FILLED ORAL at 09:02

## 2019-04-04 NOTE — PROGRESS NOTES
Shift Summary: 
 
Patient rested well overnight. The patient is alert and oriented to person. The patient has active bowel sounds and is voiding well. Patient had one BM overnight. Patient's weight is 70.081kg. VSS. Patient on bedrest and complete care. No needs stated at this time. Patient resting peacefully in bed. Bed in low position. All personal items within reach. Will continue to monitor and give bedside shift report to oncoming day shift nurse.

## 2019-04-04 NOTE — PROGRESS NOTES
Problem: Falls - Risk of 
Goal: *Absence of Falls Description Document Yohannes Holly Fall Risk and appropriate interventions in the flowsheet. Outcome: Progressing Towards Goal 
  
Problem: Patient Education: Go to Patient Education Activity Goal: Patient/Family Education Outcome: Progressing Towards Goal 
  
Problem: Pressure Injury - Risk of 
Goal: *Prevention of pressure injury Description Document Bebo Scale and appropriate interventions in the flowsheet. Outcome: Progressing Towards Goal 
  
Problem: Patient Education: Go to Patient Education Activity Goal: Patient/Family Education Outcome: Progressing Towards Goal 
  
Problem: Communication Impaired (Adult) Goal: *Speech Goal: (INSERT TEXT) Description Uzbek speaking Outcome: Progressing Towards Goal 
  
Problem: Nutrition Deficit Goal: *Optimize nutritional status Outcome: Progressing Towards Goal 
  
Problem: Patient Education: Go to Patient Education Activity Goal: Patient/Family Education Outcome: Progressing Towards Goal

## 2019-04-04 NOTE — PROGRESS NOTES
Problem: Falls - Risk of 
Goal: *Absence of Falls Description Document Yohannes Holly Fall Risk and appropriate interventions in the flowsheet. 4/4/2019 0232 by Esperanza Medina Outcome: Progressing Towards Goal 
4/4/2019 0232 by Esperanza Medina Outcome: Progressing Towards Goal 
  
Problem: Patient Education: Go to Patient Education Activity Goal: Patient/Family Education 4/4/2019 0232 by Esperanza Medina Outcome: Progressing Towards Goal 
4/4/2019 0232 by Esperanza Medina Outcome: Progressing Towards Goal 
  
Problem: Pressure Injury - Risk of 
Goal: *Prevention of pressure injury Description Document Bebo Scale and appropriate interventions in the flowsheet. 4/4/2019 0232 by Esperanza Medina Outcome: Progressing Towards Goal 
4/4/2019 0232 by Esperanza Medina Outcome: Progressing Towards Goal 
  
Problem: Patient Education: Go to Patient Education Activity Goal: Patient/Family Education 4/4/2019 0232 by Esperanza Medina Outcome: Progressing Towards Goal 
4/4/2019 0232 by Esperanza Medina Outcome: Progressing Towards Goal 
  
Problem: Communication Impaired (Adult) Goal: *Speech Goal: (INSERT TEXT) Description British speaking Outcome: Progressing Towards Goal 
  
Problem: Nutrition Deficit Goal: *Optimize nutritional status Outcome: Progressing Towards Goal 
  
Problem: Patient Education: Go to Patient Education Activity Goal: Patient/Family Education Outcome: Progressing Towards Goal

## 2019-04-04 NOTE — PROGRESS NOTES
Interpreting services available today from 6:00 am-4:30 pm. [facility]  at [phone] for any requests. Raven Emerson [facility] Kishore Martin Rank Patient Relations & Interpreting Services 
c: [phone] / [email or web address] Fiona [address] / [place], [address] [facility] 
[email or web address] [facility]

## 2019-04-04 NOTE — PROGRESS NOTES
available on-site from 4:30 p.m. - 1:00 a.m. Please call [phone] with any interpreting requests. Thank you, SHAD Cobos / 
Neftali Medina Patient Relations & Interpreting Services 
c: [phone] / [email or web address] 
[address] / [place], [address] [facility] 
[email or web address]. [facility]

## 2019-04-04 NOTE — PROGRESS NOTES
Interdisciplinary Rounds completed 04/04/19. Nursing, Case Management, Physician and PT present. 
  
Plan of care reviewed and updated.

## 2019-04-04 NOTE — PROGRESS NOTES
Hospitalist Progress Note Admit Date:  3/29/2019  6:44 PM  
Name:  Tiffani Wilson Age:  80 y.o. 
:  3/19/1931 MRN:  [id number] PCP:  Unknown, Provider Treatment Team: Attending Provider: Leroy Atkinson MD; Utilization Review: Otf Grider RN; Care Manager: Harpreet Wetzel RN Subjective:  
CC: fever, hypoxia, increased confusions Patient is an 81 yo male with PMH of AD, CKD, and CHF who presented via EMS from nursing facility with fever, hypoxia, and worsening confusion. Temp in .3 with no clear source. Chemistry was unremarkable. UA negative. CXR neg. IV vanc and zosyn started. Patient complained of  RLQ pain. CT abd pelvis identified bilateral pleural effusions. Also noted atherosclerosis and lumbar compression fx. On , patient on 5LNC - CXR with apparent interval worsening of mild pulmonary edema and bilateral small pleural effusions. Patient given lasix IV x 1. Repeat  cxr with some improvement in pulmonary infiltrates and basilar effusion. Today patient reports chest pain with breathing. Patient on [facility]. EKG ordered but no acute findings. Troponin negative. CT chest with small pleural effusions with lower lobe atelectasis or consolidation right lower lobe. No leukocytosis. Afebrile. Patient continues on oral levaquin. Objective:  
 
Patient Vitals for the past 24 hrs: 
 Temp Pulse Resp BP SpO2  
19 1419 97.8 °F (36.6 °C) 65 18 145/61 94 % 19 1152 98.1 °F (36.7 °C) 61 20 129/58 90 % 19 1126     93 % 19 0735 98.6 °F (37 °C) 64 20 125/55 93 % 19 0402 98.1 °F (36.7 °C) 74 18 127/70 93 % 19 2300 98 °F (36.7 °C) 72 18 129/78 91 % 19 1934 °F (36.6 °C) 75 16 133/54 90 % 19 1520 97.7 °F (36.5 °C) 65 16 130/55 91 % Oxygen Therapy O2 Sat (%): 94 % (19 1419) Pulse via Oximetry: 56 beats per minute (19 0748) O2 Device: Nasal cannula (19 1126) O2 Flow Rate (L/min): 2 l/min (04/04/19 1126) Intake/Output Summary (Last 24 hours) at 4/4/2019 1424 Last data filed at 4/4/2019 1152 Gross per 24 hour Intake 480 ml Output  Net 480 ml REVIEW OF SYSTEMS: Comprehensive ROS performed and negative except as stated in HPI. Physical Examination: 
General:    Well nourished. Awake and alert. Oriented x 1 Head:  Normocephalic, atraumatic Eyes:  Extraocular movements intact CV:   RRR. No  Murmurs, clicks, or gallops Lungs:   Unlabored, no cyanosis. Diminished. 2LNC Abdomen:   Soft, nondistended, indicates pain RLQc Extremities: Warm and dry. No cyanosis or edema. Skin:     No rashes or jaundice. Neuro:  No gross focal deficits Psych:  Mood and affect appropriate Data Review: 
I have reviewed all labs, meds, telemetry events, and studies from the last 24 hours. Recent Results (from the past 24 hour(s)) CBC WITH AUTOMATED DIFF Collection Time: 04/04/19  6:14 AM  
Result Value Ref Range WBC 6.2 4.3 - 11.1 K/uL  
 RBC 3.58 (L) 4.23 - 5.6 M/uL  
 HGB 11.7 (L) 13.6 - 17.2 g/dL HCT 36.8 (L) 41.1 - 50.3 % .8 (H) 79.6 - 97.8 FL  
 MCH 32.7 26.1 - 32.9 PG  
 MCHC 31.8 31.4 - 35.0 g/dL  
 RDW 12.2 11.9 - 14.6 % PLATELET 937 408 - 800 K/uL MPV 9.7 9.4 - 12.3 FL ABSOLUTE NRBC 0.00 0.0 - 0.2 K/uL  
 DF AUTOMATED NEUTROPHILS 54 43 - 78 % LYMPHOCYTES 25 13 - 44 % MONOCYTES 13 (H) 4.0 - 12.0 % EOSINOPHILS 7 0.5 - 7.8 % BASOPHILS 1 0.0 - 2.0 % IMMATURE GRANULOCYTES 0 0.0 - 5.0 %  
 ABS. NEUTROPHILS 3.4 1.7 - 8.2 K/UL  
 ABS. LYMPHOCYTES 1.6 0.5 - 4.6 K/UL  
 ABS. MONOCYTES 0.8 0.1 - 1.3 K/UL  
 ABS. EOSINOPHILS 0.4 0.0 - 0.8 K/UL  
 ABS. BASOPHILS 0.1 0.0 - 0.2 K/UL  
 ABS. IMM. GRANS. 0.0 0.0 - 0.5 K/UL METABOLIC PANEL, BASIC Collection Time: 04/04/19  6:14 AM  
Result Value Ref Range Sodium 141 136 - 145 mmol/L Potassium 3.7 3.5 - 5.1 mmol/L  Chloride 103 98 - 107 mmol/L  
 CO2 30 21 - 32 mmol/L Anion gap 8 7 - 16 mmol/L Glucose 85 65 - 100 mg/dL BUN 33 (H) 8 - 23 MG/DL Creatinine 1.29 0.8 - 1.5 MG/DL  
 GFR est AA >60 >60 ml/min/1.73m2 GFR est non-AA 56 (L) >60 ml/min/1.73m2 Calcium 8.8 8.3 - 10.4 MG/DL  
TROPONIN I Collection Time: 04/04/19  6:14 AM  
Result Value Ref Range Troponin-I, Qt. <0.02 (L) 0.02 - 0.05 NG/ML  
EKG, 12 LEAD, INITIAL Collection Time: 04/04/19 10:02 AM  
Result Value Ref Range Ventricular Rate 59 BPM  
 Atrial Rate 59 BPM  
 P-R Interval 196 ms QRS Duration 66 ms  
 Q-T Interval 440 ms QTC Calculation (Bezet) 435 ms Calculated P Axis 31 degrees Calculated R Axis -13 degrees Calculated T Axis -151 degrees Diagnosis Sinus bradycardia Inferior infarct , age undetermined Anterior infarct (cited on or before 04-APR-2019) ST & T wave abnormality, consider lateral ischemia Abnormal ECG When compared with ECG of 29-MAR-2019 19:15, 
rate has slowed  
axis changed Serial changes of Anterior infarct Present Confirmed by INDIRA PERALTA (), Shahida Sauer ([id number]) on 4/4/2019 11:04:41 AM 
  
  
 
All Micro Results Procedure Component Value Units Date/Time CULTURE, BLOOD [[id number]] Collected:  03/29/19 1912 Order Status:  Completed Specimen:  Blood Updated:  04/03/19 0910 Special Requests: --     
  RIGHT Antecubital 
  
  Culture result: NO GROWTH 5 DAYS     
 CULTURE, BLOOD [[id number]] Collected:  03/29/19 1912 Order Status:  Completed Specimen:  Blood Updated:  04/03/19 0910 Special Requests: --     
  LEFT 
FOREARM Culture result: NO GROWTH 5 DAYS INFLUENZA A & B AG (RAPID TEST) [[id number]] Collected:  03/29/19 2007 Order Status:  Completed Specimen:  Nasopharyngeal from Nasal washing Updated:  03/29/19 2036 Influenza A Ag NEGATIVE Comment: NEGATIVE FOR THE PRESENCE OF INFLUENZA A ANTIGEN 
INFECTION DUE TO INFLUENZA A CANNOT BE RULED OUT. BECAUSE THE ANTIGEN PRESENT IN THE SAMPLE MAY BE BELOW 
THE DETECTION LIMIT OF THE TEST. A NEGATIVE TEST IS PRESUMPTIVE AND IT IS RECOMMENDED THAT THESE RESULTS BE CONFIRMED BY VIRAL CULTURE OR AN FDA-CLEARED INFLUENZA A AND B MOLECULAR ASSAY. Influenza B Ag NEGATIVE Comment: NEGATIVE FOR THE PRESENCE OF INFLUENZA B ANTIGEN 
INFECTION DUE TO INFLUENZA B CANNOT BE RULED OUT. BECAUSE THE ANTIGEN PRESENT IN THE SAMPLE MAY BE BELOW 
THE DETECTION LIMIT OF THE TEST. A NEGATIVE TEST IS PRESUMPTIVE AND IT IS RECOMMENDED THAT THESE RESULTS BE CONFIRMED BY VIRAL CULTURE OR AN FDA-CLEARED INFLUENZA A AND B MOLECULAR ASSAY. Source NASOPHARYNGEAL Current Meds: 
Current Facility-Administered Medications Medication Dose Route Frequency  levoFLOXacin (LEVAQUIN) tablet 500 mg  500 mg Oral Q48H  
 escitalopram oxalate (LEXAPRO) tablet 10 mg  10 mg Oral DAILY  levothyroxine (SYNTHROID) tablet 75 mcg  75 mcg Oral ACB  rivastigmine (EXELON) 13.3 mg/24 hour patch 1 Patch  1 Patch TransDERmal DAILY  valproic acid (as sodium salt) (DEPAKENE) 250 mg/5 mL (5 mL) oral solution 250 mg  250 mg Oral Q12H  
 ARIPiprazole (ABILIFY) tablet 12 mg  12 mg Oral Q12H  ergocalciferol capsule 50,000 Units  50,000 Units Oral every Monday  tamsulosin (FLOMAX) capsule 0.4 mg  0.4 mg Oral DAILY  furosemide (LASIX) tablet 20 mg  20 mg Oral DAILY  memantine (NAMENDA) tablet 5 mg  5 mg Oral Q12H  
 amLODIPine (NORVASC) tablet 5 mg  5 mg Oral DAILY  aspirin delayed-release tablet 81 mg  81 mg Oral DAILY  losartan (COZAAR) tablet 100 mg  100 mg Oral DAILY  oxybutynin chloride XL (DITROPAN XL) tablet 5 mg  5 mg Oral QHS  pravastatin (PRAVACHOL) tablet 20 mg  20 mg Oral QHS  calcium-vitamin D (OS-JOANN) 500 mg-200 unit tablet  1 Tab Oral BID WITH MEALS  docusate sodium (COLACE) capsule 100 mg  100 mg Oral BID  ferrous sulfate tablet 325 mg  1 Tab Oral DAILY WITH BREAKFAST  metoprolol succinate (TOPROL-XL) XL tablet 12.5 mg  12.5 mg Oral DAILY  cyanocobalamin tablet 1,000 mcg  1,000 mcg Oral DAILY  folic acid-vit [id number] B68 (FOLTX) 2.5-25-2 mg tablet 1 Tab  1 Tab Oral DAILY  traMADol (ULTRAM) tablet 50 mg  50 mg Oral Q6H PRN  
 albuterol (PROVENTIL VENTOLIN) nebulizer solution 2.5 mg  2.5 mg Nebulization Q4H PRN  
 sodium chloride (NS) flush 5-40 mL  5-40 mL IntraVENous Q8H  
 sodium chloride (NS) flush 5-40 mL  5-40 mL IntraVENous PRN  
 acetaminophen (TYLENOL) tablet 650 mg  650 mg Oral Q4H PRN  
 ondansetron (ZOFRAN) injection 4 mg  4 mg IntraVENous Q4H PRN  
 enoxaparin (LOVENOX) injection 40 mg  40 mg SubCUTAneous Q24H Diet: DIET CARDIAC Other Studies (last 24 hours): 
Ct Chest Wo Cont Result Date: 4/4/2019 CT CHEST WITHOUT CONTRAST 4/4/2019 HISTORY: Chest pain with breathing TECHNIQUE: Noncontrast axial images were obtained through the chest.     All CT scans at this facility used dose modulation, iterative reconstruction and/or weight based dosing when appropriate to reduce radiation dose to as low as reasonably achievable. COMPARISON: Portable chest x-ray from the previous day FINDINGS: Small bilateral pleural effusions are present with atelectasis in the lower lobes. There is minimal atelectasis or consolidation right lower lobe. The central airways are patent. There is no thoracic adenopathy. A trace pericardial effusion is present. Included images of the upper abdomen demonstrate normal-appearing adrenal glands. Cholecystectomy clips are present. There are no aggressive osseous lesions. IMPRESSION: Small pleural effusions with lower lobe atelectasis and minimal atelectasis or consolidation in the right lower lobe. Assessment and Plan:  
 
Hospital Problems as of 4/4/2019 Date Reviewed: 9/17/2018 Codes Class Noted - Resolved POA * (Principal) Fever ICD-10-CM: R50.9 ICD-9-CM: 780.60  3/29/2019 - Present Unknown Hypertension ICD-10-CM: I10 
ICD-9-CM: 401.9  Unknown - Present Yes  
   
 CHF (congestive heart failure) (HCC) ICD-10-CM: I50.9 ICD-9-CM: 428.0  Unknown - Present Yes Hypothyroidism ICD-10-CM: E03.9 ICD-9-CM: 244.9  Unknown - Present Yes Diabetes ([address] ICD-10-CM: E11.9 ICD-9-CM: 250.00  Unknown - Present Yes Delirium ICD-10-CM: R41.0 ICD-9-CM: 780.09  11/12/2016 - Present Yes A/P:   
Fever/Delirium 
- Unclear source - CXR 
- UA is normal 
- Flu is negative - Will continue Tylenol  
- CT abd/pelvis with contrast showed pulm effusions. 
-continue po levaquin  
-wean O2 
-continue levaquin EOT 4/6 
  
CHF 
-Echo EF 60-65% 
- Continue home meds 
-I&O's  
  
DMII 
- SSI 
  
Hypothyroidism 
- Home meds 
- TSH 3.310 
  
Alzheimer's Dementia - Patient lives in [address] very pleasantly confused - Daughter reports he is fairly close to his baseline 
  
DC planning/Dispo:  Return to NH, 1-2 days DVT ppx:  Lovenox Code status: DNR Medical decision maker: No MPOA on file,  Daughter is emergency contact Case reviewed with supervising physician - Dr. Misael Guillory Signed: 
Garett Hess NP-C

## 2019-04-05 VITALS
BODY MASS INDEX: 24.49 KG/M2 | RESPIRATION RATE: 18 BRPM | SYSTOLIC BLOOD PRESSURE: 128 MMHG | TEMPERATURE: 98.4 F | HEIGHT: 65 IN | OXYGEN SATURATION: 94 % | WEIGHT: 147 LBS | HEART RATE: 73 BPM | DIASTOLIC BLOOD PRESSURE: 60 MMHG

## 2019-04-05 LAB
ANION GAP SERPL CALC-SCNC: 2 MMOL/L (ref 7–16)
BASOPHILS # BLD: 0.1 K/UL (ref 0–0.2)
BASOPHILS NFR BLD: 1 % (ref 0–2)
BUN SERPL-MCNC: 32 MG/DL (ref 8–23)
CALCIUM SERPL-MCNC: 9.1 MG/DL (ref 8.3–10.4)
CHLORIDE SERPL-SCNC: 102 MMOL/L (ref 98–107)
CO2 SERPL-SCNC: 36 MMOL/L (ref 21–32)
CREAT SERPL-MCNC: 1.35 MG/DL (ref 0.8–1.5)
DIFFERENTIAL METHOD BLD: ABNORMAL
EOSINOPHIL # BLD: 0.4 K/UL (ref 0–0.8)
EOSINOPHIL NFR BLD: 7 % (ref 0.5–7.8)
ERYTHROCYTE [DISTWIDTH] IN BLOOD BY AUTOMATED COUNT: 12.4 % (ref 11.9–14.6)
GLUCOSE SERPL-MCNC: 90 MG/DL (ref 65–100)
HCT VFR BLD AUTO: 34.7 % (ref 41.1–50.3)
HGB BLD-MCNC: 11.4 G/DL (ref 13.6–17.2)
IMM GRANULOCYTES # BLD AUTO: 0 K/UL (ref 0–0.5)
IMM GRANULOCYTES NFR BLD AUTO: 0 % (ref 0–5)
LYMPHOCYTES # BLD: 2 K/UL (ref 0.5–4.6)
LYMPHOCYTES NFR BLD: 34 % (ref 13–44)
MCH RBC QN AUTO: 32.9 PG (ref 26.1–32.9)
MCHC RBC AUTO-ENTMCNC: 32.9 G/DL (ref 31.4–35)
MCV RBC AUTO: 100.3 FL (ref 79.6–97.8)
MONOCYTES # BLD: 0.7 K/UL (ref 0.1–1.3)
MONOCYTES NFR BLD: 12 % (ref 4–12)
NEUTS SEG # BLD: 2.8 K/UL (ref 1.7–8.2)
NEUTS SEG NFR BLD: 46 % (ref 43–78)
NRBC # BLD: 0 K/UL (ref 0–0.2)
PLATELET # BLD AUTO: 227 K/UL (ref 150–450)
PMV BLD AUTO: 9.6 FL (ref 9.4–12.3)
POTASSIUM SERPL-SCNC: 4.2 MMOL/L (ref 3.5–5.1)
RBC # BLD AUTO: 3.46 M/UL (ref 4.23–5.6)
SODIUM SERPL-SCNC: 140 MMOL/L (ref 136–145)
WBC # BLD AUTO: 6 K/UL (ref 4.3–11.1)

## 2019-04-05 PROCEDURE — 80048 BASIC METABOLIC PNL TOTAL CA: CPT

## 2019-04-05 PROCEDURE — 36415 COLL VENOUS BLD VENIPUNCTURE: CPT

## 2019-04-05 PROCEDURE — BT40ZZZ ULTRASONOGRAPHY OF BLADDER: ICD-10-PCS | Performed by: FAMILY MEDICINE

## 2019-04-05 PROCEDURE — 74011250637 HC RX REV CODE- 250/637: Performed by: FAMILY MEDICINE

## 2019-04-05 PROCEDURE — 51798 US URINE CAPACITY MEASURE: CPT

## 2019-04-05 PROCEDURE — 94760 N-INVAS EAR/PLS OXIMETRY 1: CPT

## 2019-04-05 PROCEDURE — 85025 COMPLETE CBC W/AUTO DIFF WBC: CPT

## 2019-04-05 PROCEDURE — 77010033678 HC OXYGEN DAILY

## 2019-04-05 PROCEDURE — 77030011943

## 2019-04-05 RX ORDER — ALBUTEROL SULFATE 90 UG/1
2 AEROSOL, METERED RESPIRATORY (INHALATION)
Qty: 1 INHALER | Refills: 0 | Status: SHIPPED
Start: 2019-04-05

## 2019-04-05 RX ORDER — LEVOFLOXACIN 500 MG/1
500 TABLET, FILM COATED ORAL
Qty: 1 TAB | Refills: 0 | Status: SHIPPED | OUTPATIENT
Start: 2019-04-06 | End: 2019-04-07

## 2019-04-05 RX ORDER — METOPROLOL SUCCINATE 25 MG/1
12.5 TABLET, EXTENDED RELEASE ORAL DAILY
Qty: 15 TAB | Refills: 0 | Status: SHIPPED
Start: 2019-04-05 | End: 2019-05-05

## 2019-04-05 RX ADMIN — AMLODIPINE BESYLATE 5 MG: 5 TABLET ORAL at 08:55

## 2019-04-05 RX ADMIN — FUROSEMIDE 20 MG: 20 TABLET ORAL at 08:54

## 2019-04-05 RX ADMIN — LOSARTAN POTASSIUM 100 MG: 50 TABLET ORAL at 08:51

## 2019-04-05 RX ADMIN — ARIPIPRAZOLE 12 MG: 10 TABLET ORAL at 08:53

## 2019-04-05 RX ADMIN — ASPIRIN 81 MG: 81 TABLET, COATED ORAL at 08:51

## 2019-04-05 RX ADMIN — VALPROIC ACID 250 MG: 250 SOLUTION ORAL at 08:56

## 2019-04-05 RX ADMIN — TAMSULOSIN HYDROCHLORIDE 0.4 MG: 0.4 CAPSULE ORAL at 08:52

## 2019-04-05 RX ADMIN — Medication 10 ML: at 05:09

## 2019-04-05 RX ADMIN — Medication 1 TABLET: at 08:51

## 2019-04-05 RX ADMIN — DOCUSATE SODIUM 100 MG: 100 CAPSULE, LIQUID FILLED ORAL at 08:52

## 2019-04-05 RX ADMIN — FERROUS SULFATE TAB 325 MG (65 MG ELEMENTAL FE) 325 MG: 325 (65 FE) TAB at 08:54

## 2019-04-05 RX ADMIN — LEVOTHYROXINE SODIUM 75 MCG: 75 TABLET ORAL at 05:09

## 2019-04-05 RX ADMIN — CYANOCOBALAMIN TAB 1000 MCG 1000 MCG: 1000 TAB at 08:51

## 2019-04-05 RX ADMIN — MEMANTINE HYDROCHLORIDE 5 MG: 5 TABLET ORAL at 08:55

## 2019-04-05 RX ADMIN — ESCITALOPRAM OXALATE 10 MG: 10 TABLET ORAL at 08:55

## 2019-04-05 RX ADMIN — CALCIUM CARBONATE 500 MG (1,250 MG)-VITAMIN D3 200 UNIT TABLET 1 TABLET: at 08:50

## 2019-04-05 RX ADMIN — METOPROLOL SUCCINATE 12.5 MG: 25 TABLET, EXTENDED RELEASE ORAL at 08:54

## 2019-04-05 NOTE — PROGRESS NOTES
Problem: Falls - Risk of 
Goal: *Absence of Falls Description Document Durenda Ray Fall Risk and appropriate interventions in the flowsheet. Outcome: Progressing Towards Goal 
  
Problem: Patient Education: Go to Patient Education Activity Goal: Patient/Family Education Outcome: Progressing Towards Goal 
  
Problem: Pressure Injury - Risk of 
Goal: *Prevention of pressure injury Description Document Bebo Scale and appropriate interventions in the flowsheet. Outcome: Progressing Towards Goal 
  
Problem: Patient Education: Go to Patient Education Activity Goal: Patient/Family Education Outcome: Progressing Towards Goal

## 2019-04-05 NOTE — DISCHARGE INSTRUCTIONS
Follow up with primary care provider and discuss the events of this admission. Kam catheter in place for urinary retention. May do voiding trial in 24 hours. Drink plenty of fluids. Assist patient with meals.

## 2019-04-05 NOTE — PROGRESS NOTES
Problem: Falls - Risk of 
Goal: *Absence of Falls Description Document Clarence Nevarez Fall Risk and appropriate interventions in the flowsheet. Outcome: Progressing Towards Goal 
  
Problem: Patient Education: Go to Patient Education Activity Goal: Patient/Family Education Outcome: Progressing Towards Goal 
  
Problem: Pressure Injury - Risk of 
Goal: *Prevention of pressure injury Description Document Bebo Scale and appropriate interventions in the flowsheet. Outcome: Progressing Towards Goal 
  
Problem: Nutrition Deficit Goal: *Optimize nutritional status Outcome: Progressing Towards Goal

## 2019-04-05 NOTE — ROUTINE PROCESS
available onsite for any request.  
 
Shadi Solorio CHI//  Patient Relations & Interpreting Services 
p: [phone] / [email or web address]

## 2019-04-05 NOTE — PROGRESS NOTES
Pt to Dc today to long term bed at [facility]. Room 604 and report line [phone] given to RN, Nanci Rico. Erika Guerra to transport-3pm.  Family notified. Care Management Interventions PCP Verified by CM: Yes Mode of Transport at Discharge: BLS Transition of Care Consult (CM Consult): Discharge Planning, [address] Discharge Durable Medical Equipment: No 
Current Support Network: [address] [place] [address] Confirm Follow Up Transport: Other (see comment) Plan discussed with Pt/Family/Caregiver: Yes Freedom of Choice Offered: Yes Discharge Location Discharge Placement: [address]

## 2019-04-05 NOTE — PROGRESS NOTES
Shift Summary: 
 
Patient rested well overnight. The patient is alert and oriented x1. Patient is irritable this morning and would not void. RN bladder scanned patient. Scanner showed 425ml in bladder. Patient was straight cath. Output was 600ml. VSS. Patient on bedrest and complete care. No needs stated at this time. Patient resting peacefully in bed. Bed in low position. All personal items within reach. Will continue to monitor and give bedside shift report to oncoming day shift nurse.

## 2019-04-05 NOTE — DISCHARGE SUMMARY
Hospitalist Discharge Summary     Admit Date:  3/29/2019  6:44 PM   Name:  Natty Mark   Age:  80 y.o.  :  3/19/1931   MRN:  [id number]   PCP:  Unknown, Provider  Treatment Team: Attending Provider: Aurelia Lr MD; Utilization Review: Joleen Nagy RN; Care Manager: Alysa Lopez, RN; Charge Nurse: Seun Sheth    Problem List for this Hospitalization:  Hospital Problems as of 2019 Date Reviewed: 2018          Codes Class Noted - Resolved POA    * (Principal) Fever ICD-10-CM: R50.9  ICD-9-CM: 780.60  3/29/2019 - Present Unknown        Hypertension ICD-10-CM: I10  ICD-9-CM: 401.9  Unknown - Present Yes        CHF (congestive heart failure) ([facility] [address] ICD-10-CM: I50.9  ICD-9-CM: 428.0  Unknown - Present Yes        Hypothyroidism ICD-10-CM: E03.9  ICD-9-CM: 244.9  Unknown - Present Yes        Diabetes ([facility] [address] ICD-10-CM: E11.9  ICD-9-CM: 250.00  Unknown - Present Yes        Delirium ICD-10-CM: R41.0  ICD-9-CM: 780.09  2016 - Present Yes                Admission HPI from 3/29/2019:    \"Review H&P for details of admission [facility] Course:    Patient is an 79 yo male with PMH of AD, CKD, and CHF who presented via EMS from nursing facility with fever, hypoxia, and worsening confusion. Temp in .3 with no clear source. Chemistry was unremarkable. UA negative. CXR neg. Repeat xray with pulmonary infiltrates. IV vanc and zosyn started and switched to oral levaquin to continue at discharge. Patient complained of  RLQ pain. CT abd pelvis identified bilateral pleural effusions. Patient given IV lasix and transitioned to oral home medication. Patient continues to require supplemental oxygen 2LNC at discharge. Patient had urinary retention and had to be straight cathed on . Discussed with urology. If problem persists. To anchor rousseau catheter and follow up with Dr. Mauricio Quiroz in office in one week. Patient to be discharged back to SNF and follow up with PCP in 3-5 days.              Follow up instructions and discharge meds at bottom of this note. Plan was discussed with daughter. All questions answered. Patient was stable at time of discharge. Diagnostic Imaging/Tests:   Xr Chest Port    Result Date: 3/29/2019  Portable chest: History: sepsis. Comparison: 2018 Findings: A single view of the chest was obtained at 1929 hours. The cardiac silhouette is borderline enlarged, unchanged. The lungs and pleural spaces are clear. The pulmonary vascularity is within normal limits. Impression: Borderline enlarged cardiac silhouette with grossly clear lungs. Echocardiogram results:  Results for orders placed or performed during the hospital encounter of 19   2D ECHO COMPLETE ADULT (TTE) [address]  [address] [facility], [address] [facility]  [phone]    Transthoracic Echocardiogram  2D, M-mode, Doppler, and Color Doppler    Patient: Rodri Epperson  MR #: [id number]  : 19-Mar-1931  Age: 80 years  Gender: Male  Study date: 2019  Account #: [de-identified]  Height: 65 in  Weight: 123.6 lb  BSA: 1.61 mï¾²  Status:Routine  Location: [facility]  BP: 124/ 65    Allergies: NO KNOWN ALLERGENS    Sonographer:  Meghan Mathews RD  Group:  [facility]  Referring Physician:  Nadia Stein MD  Reading Physician:  IDA Sullivan MD [facility]    INDICATIONS: CHF. PROCEDURE: This was a routine study. A transthoracic echocardiogram was  performed. The study included complete 2D imaging, M-mode, complete spectral  Doppler, and color Doppler. Image quality was adequate. LEFT VENTRICLE: Size was normal. Systolic function was normal. Ejection  fraction was estimated in the range of 60 % to 65 %. There were no regional  wall motion abnormalities. There was mild concentric hypertrophy. Avg. E/e'=  9.8. Left ventricular diastolic function is difficult to determine.     RIGHT VENTRICLE: The size was normal. Systolic function was normal. The  tricuspid jet envelope definition was inadequate for estimation of RV   systolic  pressure. LEFT ATRIUM: The atrium was mildly to moderately dilated. RIGHT ATRIUM: Size was normal.    SYSTEMIC VEINS: IVC: The inferior vena cava was normal in size and course. The  respirophasic change in diameter was more than 50%. AORTIC VALVE: The valve was trileaflet. Leaflets exhibited mild sclerosis. There was no evidence for stenosis. There was no insufficiency. MITRAL VALVE: Valve structure was normal. There was no evidence for stenosis. There was no regurgitation. TRICUSPID VALVE: The valve structure was normal. There was no evidence for  stenosis. There was trivial regurgitation. PULMONIC VALVE: Not well visualized. There was no evidence for stenosis. There  was no insufficiency. PERICARDIUM: There was no pericardial effusion. AORTA: The root exhibited normal size. SUMMARY:    -  Left ventricle: Systolic function was normal. Ejection fraction was  estimated in the range of 60 % to 65 %. There were no regional wall motion  abnormalities. There was mild concentric hypertrophy. Avg. E/e'= 9.8. Left  ventricular diastolic function is difficult to determine.    -  Left atrium: The atrium was mildly to moderately dilated. -  Inferior vena cava, hepatic veins: The respirophasic change in diameter   was  more than 50%. -  Aortic valve: The valve was trileaflet. Leaflets exhibited mild sclerosis. -  Tricuspid valve: There was trivial regurgitation. SYSTEM MEASUREMENT TABLES    2D mode  AoR Diam (2D): 3.1 cm  LA Dimension (2D): 2.7 cm  Left Atrium Systolic Volume Index; Method of Disks, Biplane; 2D mode;: 40   ml/m2  IVS/LVPW (2D): 1.1  IVSd (2D): 1.2 cm  LVIDd (2D): 4.1 cm  LVIDs (2D): 2.4 cm  LVOT Area (2D): 3.8 cm2  LVPWd (2D): 1.1 cm  RVIDd (2D): 2.9 cm    Unspecified Scan Mode  Peak Grad; Mean; Antegrade Flow: 8 mm[Hg]  Vmax;  Antegrade Flow: 143 cm/s  LVOT Diam: 2.2 cm    Prepared and signed by    IDA Delgadillo MD [facility]  Signed 03-Apr-2019 12:12:49           All Micro Results     Procedure Component Value Units Date/Time    CULTURE, BLOOD [[id number]] Collected:  03/29/19 1912    Order Status:  Completed Specimen:  Blood Updated:  04/03/19 0910     Special Requests: --        RIGHT  Antecubital       Culture result: NO GROWTH 5 DAYS       CULTURE, BLOOD [[id number]] Collected:  03/29/19 1912    Order Status:  Completed Specimen:  Blood Updated:  04/03/19 0910     Special Requests: --        LEFT  FOREARM       Culture result: NO GROWTH 5 DAYS       INFLUENZA A & B AG (RAPID TEST) [[id number]] Collected:  03/29/19 2007    Order Status:  Completed Specimen:  Nasopharyngeal from Nasal washing Updated:  03/29/19 2036     Influenza A Ag NEGATIVE         Comment: NEGATIVE FOR THE PRESENCE OF INFLUENZA A ANTIGEN  INFECTION DUE TO INFLUENZA A CANNOT BE RULED OUT. BECAUSE THE ANTIGEN PRESENT IN THE SAMPLE MAY BE BELOW  THE DETECTION LIMIT OF THE TEST. A NEGATIVE TEST IS PRESUMPTIVE AND IT IS RECOMMENDED THAT THESE RESULTS BE CONFIRMED BY VIRAL CULTURE OR AN FDA-CLEARED INFLUENZA A AND B MOLECULAR ASSAY. Influenza B Ag NEGATIVE         Comment: NEGATIVE FOR THE PRESENCE OF INFLUENZA B ANTIGEN  INFECTION DUE TO INFLUENZA B CANNOT BE RULED OUT. BECAUSE THE ANTIGEN PRESENT IN THE SAMPLE MAY BE BELOW  THE DETECTION LIMIT OF THE TEST. A NEGATIVE TEST IS PRESUMPTIVE AND IT IS RECOMMENDED THAT THESE RESULTS BE CONFIRMED BY VIRAL CULTURE OR AN FDA-CLEARED INFLUENZA A AND B MOLECULAR ASSAY.           Source NASOPHARYNGEAL             Labs: Results:       BMP, Mg, Phos Recent Labs     04/05/19 0401 04/04/19 0614 04/03/19 0431    141 141   K 4.2 3.7 4.0    103 103   CO2 36* 30 28   AGAP 2* 8 10   BUN 32* 33* 31*   CREA 1.35 1.29 1.37   CA 9.1 8.8 8.7   GLU 90 85 77      CBC Recent Labs     04/05/19 0401 04/04/19 0614 04/03/19 0431   WBC 6.0 6.2 6. 5   RBC 3.46* 3.58* 3.50*   HGB 11.4* 11.7* 11.6*   HCT 34.7* 36.8* 35.5*    206 193   GRANS 46 54 56   LYMPH 34 25 26   EOS 7 7 5   MONOS 12 13* 13*   BASOS 1 1 1   IG 0 0 0   ANEU 2.8 3.4 3.6   ABL 2.0 1.6 1.7   MARTELL 0.4 0.4 0.3   ABM 0.7 0.8 0.8   ABB 0.1 0.1 0.0   AIG 0.0 0.0 0.0      LFT No results for input(s): SGOT, ALT, TBIL, AP, TP, ALB, GLOB, AGRAT, GPT in the last 72 hours.    Cardiac Testing Lab Results   Component Value Date/Time     (H) 03/29/2019 07:12 PM     10/27/2017 02:30 PM     12/30/2016 09:47 AM     11/12/2016 11:15 AM     (H) 09/17/2018 06:17 PM    Troponin-I, Qt. <0.02 (L) 04/04/2019 06:14 AM    Troponin-I, Qt. 0.03 03/29/2019 07:12 PM    Troponin-I, Qt. 0.02 10/27/2017 02:30 PM      Coagulation Tests Lab Results   Component Value Date/Time    Prothrombin time 11.4 11/12/2016 11:15 AM    INR 1.0 11/12/2016 11:15 AM      A1c No results found for: HBA1C, HGBE8, TEC3FABZ   Lipid Panel No results found for: CHOL, CHOLPOCT, CHOLX, CHLST, CHOLV, 069235, HDL, LDL, LDLC, DLDLP, 271084, VLDLC, VLDL, TGLX, TRIGL, TRIGP, TGLPOCT, CH[facility], [facility]   Thyroid Panel Lab Results   Component Value Date/Time    TSH 3.310 03/30/2019 04:24 AM    TSH 4.930 (H) 11/12/2016 11:15 AM    T4, Free 1.0 11/12/2016 11:15 AM        Most Recent UA Lab Results   Component Value Date/Time    Color YELLOW 03/29/2019 08:07 PM    Appearance CLEAR 03/29/2019 08:07 PM    Specific gravity 1.020 03/29/2019 08:07 PM    pH (UA) 5.0 03/29/2019 08:07 PM    Protein 30 (A) 03/29/2019 08:07 PM    Glucose NEGATIVE  03/29/2019 08:07 PM    Ketone NEGATIVE  03/29/2019 08:07 PM    Bilirubin NEGATIVE  03/29/2019 08:07 PM    Blood NEGATIVE  03/29/2019 08:07 PM    Urobilinogen 0.2 03/29/2019 08:07 PM    Nitrites NEGATIVE  03/29/2019 08:07 PM    Leukocyte Esterase NEGATIVE  03/29/2019 08:07 PM        No Known Allergies  Immunization History   Administered Date(s) Administered    Influenza High Dose Vaccine PF 02/17/2018    Influenza Vaccine 01/01/2015    Pneumococcal Conjugate (PCV-13) 08/09/2014, 08/19/2014, 02/17/2018    TB Skin Test (PPD) Intradermal 11/12/2016    Tdap 01/05/2017       All Labs from Last 24 Hrs:  Recent Results (from the past 24 hour(s))   CBC WITH AUTOMATED DIFF    Collection Time: 04/05/19  4:01 AM   Result Value Ref Range    WBC 6.0 4.3 - 11.1 K/uL    RBC 3.46 (L) 4.23 - 5.6 M/uL    HGB 11.4 (L) 13.6 - 17.2 g/dL    HCT 34.7 (L) 41.1 - 50.3 %    .3 (H) 79.6 - 97.8 FL    MCH 32.9 26.1 - 32.9 PG    MCHC 32.9 31.4 - 35.0 g/dL    RDW 12.4 11.9 - 14.6 %    PLATELET 106 511 - 740 K/uL    MPV 9.6 9.4 - 12.3 FL    ABSOLUTE NRBC 0.00 0.0 - 0.2 K/uL    DF AUTOMATED      NEUTROPHILS 46 43 - 78 %    LYMPHOCYTES 34 13 - 44 %    MONOCYTES 12 4.0 - 12.0 %    EOSINOPHILS 7 0.5 - 7.8 %    BASOPHILS 1 0.0 - 2.0 %    IMMATURE GRANULOCYTES 0 0.0 - 5.0 %    ABS. NEUTROPHILS 2.8 1.7 - 8.2 K/UL    ABS. LYMPHOCYTES 2.0 0.5 - 4.6 K/UL    ABS. MONOCYTES 0.7 0.1 - 1.3 K/UL    ABS. EOSINOPHILS 0.4 0.0 - 0.8 K/UL    ABS. BASOPHILS 0.1 0.0 - 0.2 K/UL    ABS. IMM.  GRANS. 0.0 0.0 - 0.5 K/UL   METABOLIC PANEL, BASIC    Collection Time: 04/05/19  4:01 AM   Result Value Ref Range    Sodium 140 136 - 145 mmol/L    Potassium 4.2 3.5 - 5.1 mmol/L    Chloride 102 98 - 107 mmol/L    CO2 36 (H) 21 - 32 mmol/L    Anion gap 2 (L) 7 - 16 mmol/L    Glucose 90 65 - 100 mg/dL    BUN 32 (H) 8 - 23 MG/DL    Creatinine 1.35 0.8 - 1.5 MG/DL    GFR est AA >60 >60 ml/min/1.73m2    GFR est non-AA 53 (L) >60 ml/min/1.73m2    Calcium 9.1 8.3 - 10.4 MG/DL       Discharge Exam:  Patient Vitals for the past 24 hrs:   Temp Pulse Resp BP SpO2   04/05/19 1112 97.7 °F (36.5 °C) 60 20 135/53 95 %   04/05/19 1022     94 %   04/05/19 0745 98.5 °F (36.9 °C) (!) 53 18 153/74 95 %   04/05/19 0505 97.6 °F (36.4 °C) 63 16 135/70 96 %   04/04/19 2342 98 °F (36.7 °C) 67 16 146/71 94 %   04/04/19 1917 98 °F (36.7 °C) 79 16 121/52 93 %     Oxygen Therapy  O2 Sat (%): 95 % (04/05/19 1112)  Pulse via Oximetry: 56 beats per minute (04/03/19 0748)  O2 Device: Nasal cannula (04/04/19 1126)  O2 Flow Rate (L/min): 2 l/min (04/05/19 1022)  No intake or output data in the 24 hours ending 04/05/19 1430    General:    Well nourished. Alert. No distress. Eyes:   Normal sclera. Extraocular movements intact. ENT:  Normocephalic, atraumatic. Moist mucous membranes  CV:   Regular rate and rhythm. No murmur, rub, or gallop. Lungs:  Clear to auscultation bilaterally. No wheezing, rhonchi, or rales. Abdomen: Soft, nontender, nondistended. Bowel sounds normal.   Extremities: Warm and dry. No cyanosis or edema. Neurologic: CN II-XII grossly intact. Sensation intact. Skin:     No rashes or jaundice. Psych:  Normal mood and affect. Discharge Info:   Current Discharge Medication List      START taking these medications    Details   levoFLOXacin (LEVAQUIN) 500 mg tablet Take 1 Tab by mouth every fourty-eight (48) hours for 1 day. Qty: 1 Tab, Refills: 0         CONTINUE these medications which have CHANGED    Details   calcium-cholecalciferol, d3, 600 mg calcium- 200 unit cap Take 1 Tab by mouth two (2) times a day for 30 days. Qty: 60 Cap, Refills: 0      metoprolol succinate (TOPROL-XL) 25 mg XL tablet Take 0.5 Tabs by mouth daily for 30 days. Qty: 15 Tab, Refills: 0      albuterol (VENTOLIN HFA) 90 mcg/actuation inhaler Take 2 Puffs by inhalation every four (4) hours as needed for Wheezing. Qty: 1 Inhaler, Refills: 0         CONTINUE these medications which have NOT CHANGED    Details   acetaminophen (TYLENOL) 325 mg tablet Take 650 mg by mouth every four (4) hours as needed for Pain. alum-mag hydroxide-simeth (MINTOX PLUS) chew chewable tablet Take  by mouth. alum-mag hydroxide-simeth 225-200-25 mg/5 mL susp Take 30 mL by mouth every four (4) hours as needed (heartburn).  Indications: [phone]      ARIPiprazole (ABILIFY) 1 mg/mL soln oral soln Take 12 mg by mouth two (2) times a day. cetirizine (ZYRTEC) 10 mg tablet Take 10 mg by mouth daily. valproate (DEPAKENE) 250 mg/5 mL syrup Take 250 mg by mouth two (2) times a day. rivastigmine (EXELON) 13.3 mg/24 hour patch 1 Patch by TransDERmal route daily. guaiFENesin (ROBITUSSIN) 100 mg/5 mL liquid Take 200 mg by mouth every four (4) hours as needed for Cough. escitalopram oxalate (LEXAPRO) 10 mg tablet Take 10 mg by mouth daily. meloxicam (MOBIC) 7.5 mg tablet Take 7.5 mg by mouth daily. polyethylene glycol (MIRALAX) 17 gram/dose powder Take 17 g by mouth daily as needed. memantine (NAMENDA) 5 mg tablet Take 5 mg by mouth two (2) times a day. guaifenesin/dextromethorphan (ROBITUSSIN-COUGH-CHEST-YASMANY PO) Take 10 mL by mouth every six (6) hours as needed. levothyroxine (SYNTHROID) 75 mcg tablet Take 75 mcg by mouth Daily (before breakfast). cholecalciferol (VITAMIN D3) 1,000 unit tablet Take 1,000 Units by mouth every twelve (12) hours. ondansetron hcl (ZOFRAN) 4 mg tablet Take 4 mg by mouth every six (6) hours as needed for Nausea. !! nitroglycerin (NITROSTAT) 0.4 mg SL tablet by SubLINGual route every five (5) minutes as needed for Chest Pain.      ergocalciferol (DRISDOL) 50,000 unit capsule Take 50,000 Units by mouth every Monday. tamsulosin (FLOMAX) 0.4 mg capsule Take 0.4 mg by mouth daily. alendronate (FOSAMAX) 70 mg tablet Take 70 mg by mouth every Sunday. furosemide (LASIX) 40 mg tablet Take 20 mg by mouth daily. amLODIPine (NORVASC) 5 mg tablet Take 5 mg by mouth daily. omeprazole (PRILOSEC) 20 mg capsule Take 20 mg by mouth daily. aspirin delayed-release 81 mg tablet Take 81 mg by mouth daily. oxybutynin chloride XL (DITROPAN XL) 5 mg CR tablet Take 5 mg by mouth nightly. pravastatin (PRAVACHOL) 20 mg tablet Take 20 mg by mouth nightly.       docusate sodium (COLACE) 100 mg capsule Take 100 mg by mouth two (2) times a day. ferrous sulfate (IRON) 325 mg (65 mg iron) tablet Take  by mouth two (2) times a day. !! nitroglycerin (NITROSTAT) 0.4 mg SL tablet by SubLINGual route every five (5) minutes as needed for Chest Pain. !! - Potential duplicate medications found. Please discuss with provider. STOP taking these medications       calcium-cholecalciferol, d3, 600-125 mg-unit tab Comments:   Reason for Stopping:         losartan (COZAAR) 100 mg tablet Comments:   Reason for Stopping:         cyanocobalamin 1,000 mcg tablet Comments:   Reason for Stopping:         folic acid-vit S6-GWD K15 (FOLTX) 2.5-25-2 mg tablet Comments:   Reason for Stopping:         traMADol (ULTRAM) 50 mg tablet Comments:   Reason for Stopping:         memantine ER (NAMENDA XR) 28 mg capsule Comments:   Reason for Stopping:                 Disposition: SNF    Activity: PT/OT Eval and Treat  Diet: DIET CARDIAC Regular    Follow-up Appointments   Procedures    FOLLOW UP VISIT Appointment in: 3 - 5 Days     Standing Status:   Standing     Number of Occurrences:   1     Order Specific Question:   Appointment in     Answer:   3 - 5 Days         Follow-up Information     Follow up With Specialties Details Why Contact Info    [address], [address] [address] [phone]    Unknown, Provider  In 3 days post hospitalization follow up Patient not available to ask            Time spent in patient discharge planning and coordination 35 minutes.   Discharge plan discussed with Dr. Suzie Flores,   Signed:  Adis Veliz NP

## 2019-04-08 ENCOUNTER — PATIENT OUTREACH (OUTPATIENT)
Dept: CASE MANAGEMENT | Age: 84
End: 2019-04-08

## 2019-04-08 NOTE — PROGRESS NOTES
This note will not be viewable in [address] Patient DC back to LTC at [facility]. No STACEY indicated at this time as needs are met by facility staff. Episode closed.

## 2019-06-06 ENCOUNTER — HOSPITAL ENCOUNTER (OUTPATIENT)
Dept: LAB | Age: 84
Discharge: HOME | End: 2019-06-06

## 2019-06-06 LAB — BNP SERPL-MCNC: 608 PG/ML

## 2019-06-06 PROCEDURE — 83880 ASSAY OF NATRIURETIC PEPTIDE: CPT
